# Patient Record
Sex: FEMALE | Race: OTHER | HISPANIC OR LATINO | URBAN - METROPOLITAN AREA
[De-identification: names, ages, dates, MRNs, and addresses within clinical notes are randomized per-mention and may not be internally consistent; named-entity substitution may affect disease eponyms.]

---

## 2021-06-06 ENCOUNTER — EMERGENCY (EMERGENCY)
Facility: HOSPITAL | Age: 26
LOS: 1 days | Discharge: HOME | End: 2021-06-06
Attending: STUDENT IN AN ORGANIZED HEALTH CARE EDUCATION/TRAINING PROGRAM
Payer: COMMERCIAL

## 2021-06-06 VITALS
DIASTOLIC BLOOD PRESSURE: 71 MMHG | SYSTOLIC BLOOD PRESSURE: 122 MMHG | HEART RATE: 74 BPM | RESPIRATION RATE: 18 BRPM | TEMPERATURE: 98 F | OXYGEN SATURATION: 99 %

## 2021-06-06 VITALS
OXYGEN SATURATION: 100 % | HEART RATE: 68 BPM | SYSTOLIC BLOOD PRESSURE: 109 MMHG | DIASTOLIC BLOOD PRESSURE: 68 MMHG | RESPIRATION RATE: 18 BRPM

## 2021-06-06 LAB
ALBUMIN SERPL ELPH-MCNC: 4.5 G/DL — SIGNIFICANT CHANGE UP (ref 3.3–5.2)
ALP SERPL-CCNC: 63 U/L — SIGNIFICANT CHANGE UP (ref 40–120)
ALT FLD-CCNC: 10 U/L — SIGNIFICANT CHANGE UP
ANION GAP SERPL CALC-SCNC: 11 MMOL/L — SIGNIFICANT CHANGE UP (ref 5–17)
APPEARANCE UR: CLEAR — SIGNIFICANT CHANGE UP
AST SERPL-CCNC: 17 U/L — SIGNIFICANT CHANGE UP
BACTERIA # UR AUTO: NEGATIVE — SIGNIFICANT CHANGE UP
BASOPHILS # BLD AUTO: 0.07 K/UL — SIGNIFICANT CHANGE UP (ref 0–0.2)
BASOPHILS NFR BLD AUTO: 0.5 % — SIGNIFICANT CHANGE UP (ref 0–2)
BILIRUB SERPL-MCNC: 0.3 MG/DL — LOW (ref 0.4–2)
BILIRUB UR-MCNC: NEGATIVE — SIGNIFICANT CHANGE UP
BLD GP AB SCN SERPL QL: SIGNIFICANT CHANGE UP
BUN SERPL-MCNC: 9.4 MG/DL — SIGNIFICANT CHANGE UP (ref 8–20)
CALCIUM SERPL-MCNC: 9.4 MG/DL — SIGNIFICANT CHANGE UP (ref 8.6–10.2)
CHLORIDE SERPL-SCNC: 102 MMOL/L — SIGNIFICANT CHANGE UP (ref 98–107)
CO2 SERPL-SCNC: 26 MMOL/L — SIGNIFICANT CHANGE UP (ref 22–29)
COLOR SPEC: YELLOW — SIGNIFICANT CHANGE UP
CREAT SERPL-MCNC: 0.61 MG/DL — SIGNIFICANT CHANGE UP (ref 0.5–1.3)
DIFF PNL FLD: ABNORMAL
EOSINOPHIL # BLD AUTO: 0.23 K/UL — SIGNIFICANT CHANGE UP (ref 0–0.5)
EOSINOPHIL NFR BLD AUTO: 1.5 % — SIGNIFICANT CHANGE UP (ref 0–6)
EPI CELLS # UR: SIGNIFICANT CHANGE UP
GLUCOSE SERPL-MCNC: 104 MG/DL — HIGH (ref 70–99)
GLUCOSE UR QL: NEGATIVE MG/DL — SIGNIFICANT CHANGE UP
HCG SERPL-ACNC: 15.7 MIU/ML — HIGH
HCT VFR BLD CALC: 37.9 % — SIGNIFICANT CHANGE UP (ref 34.5–45)
HGB BLD-MCNC: 13.1 G/DL — SIGNIFICANT CHANGE UP (ref 11.5–15.5)
IMM GRANULOCYTES NFR BLD AUTO: 0.3 % — SIGNIFICANT CHANGE UP (ref 0–1.5)
KETONES UR-MCNC: NEGATIVE — SIGNIFICANT CHANGE UP
LEUKOCYTE ESTERASE UR-ACNC: NEGATIVE — SIGNIFICANT CHANGE UP
LYMPHOCYTES # BLD AUTO: 18.2 % — SIGNIFICANT CHANGE UP (ref 13–44)
LYMPHOCYTES # BLD AUTO: 2.8 K/UL — SIGNIFICANT CHANGE UP (ref 1–3.3)
MCHC RBC-ENTMCNC: 31.9 PG — SIGNIFICANT CHANGE UP (ref 27–34)
MCHC RBC-ENTMCNC: 34.6 GM/DL — SIGNIFICANT CHANGE UP (ref 32–36)
MCV RBC AUTO: 92.2 FL — SIGNIFICANT CHANGE UP (ref 80–100)
MONOCYTES # BLD AUTO: 0.81 K/UL — SIGNIFICANT CHANGE UP (ref 0–0.9)
MONOCYTES NFR BLD AUTO: 5.3 % — SIGNIFICANT CHANGE UP (ref 2–14)
NEUTROPHILS # BLD AUTO: 11.41 K/UL — HIGH (ref 1.8–7.4)
NEUTROPHILS NFR BLD AUTO: 74.2 % — SIGNIFICANT CHANGE UP (ref 43–77)
NITRITE UR-MCNC: NEGATIVE — SIGNIFICANT CHANGE UP
PH UR: 6.5 — SIGNIFICANT CHANGE UP (ref 5–8)
PLATELET # BLD AUTO: 305 K/UL — SIGNIFICANT CHANGE UP (ref 150–400)
POTASSIUM SERPL-MCNC: 3.8 MMOL/L — SIGNIFICANT CHANGE UP (ref 3.5–5.3)
POTASSIUM SERPL-SCNC: 3.8 MMOL/L — SIGNIFICANT CHANGE UP (ref 3.5–5.3)
PROT SERPL-MCNC: 7.5 G/DL — SIGNIFICANT CHANGE UP (ref 6.6–8.7)
PROT UR-MCNC: NEGATIVE MG/DL — SIGNIFICANT CHANGE UP
RBC # BLD: 4.11 M/UL — SIGNIFICANT CHANGE UP (ref 3.8–5.2)
RBC # FLD: 11.3 % — SIGNIFICANT CHANGE UP (ref 10.3–14.5)
RBC CASTS # UR COMP ASSIST: ABNORMAL /HPF (ref 0–4)
SODIUM SERPL-SCNC: 139 MMOL/L — SIGNIFICANT CHANGE UP (ref 135–145)
SP GR SPEC: 1.01 — SIGNIFICANT CHANGE UP (ref 1.01–1.02)
UROBILINOGEN FLD QL: NEGATIVE MG/DL — SIGNIFICANT CHANGE UP
WBC # BLD: 15.37 K/UL — HIGH (ref 3.8–10.5)
WBC # FLD AUTO: 15.37 K/UL — HIGH (ref 3.8–10.5)
WBC UR QL: SIGNIFICANT CHANGE UP

## 2021-06-06 PROCEDURE — 86850 RBC ANTIBODY SCREEN: CPT

## 2021-06-06 PROCEDURE — 99284 EMERGENCY DEPT VISIT MOD MDM: CPT

## 2021-06-06 PROCEDURE — 36415 COLL VENOUS BLD VENIPUNCTURE: CPT

## 2021-06-06 PROCEDURE — 86901 BLOOD TYPING SEROLOGIC RH(D): CPT

## 2021-06-06 PROCEDURE — 87086 URINE CULTURE/COLONY COUNT: CPT

## 2021-06-06 PROCEDURE — 86900 BLOOD TYPING SEROLOGIC ABO: CPT

## 2021-06-06 PROCEDURE — 76817 TRANSVAGINAL US OBSTETRIC: CPT | Mod: 26,59

## 2021-06-06 PROCEDURE — 80053 COMPREHEN METABOLIC PANEL: CPT

## 2021-06-06 PROCEDURE — 84702 CHORIONIC GONADOTROPIN TEST: CPT

## 2021-06-06 PROCEDURE — 99284 EMERGENCY DEPT VISIT MOD MDM: CPT | Mod: 25

## 2021-06-06 PROCEDURE — 81001 URINALYSIS AUTO W/SCOPE: CPT

## 2021-06-06 PROCEDURE — 76801 OB US < 14 WKS SINGLE FETUS: CPT

## 2021-06-06 PROCEDURE — 76817 TRANSVAGINAL US OBSTETRIC: CPT

## 2021-06-06 PROCEDURE — 85025 COMPLETE CBC W/AUTO DIFF WBC: CPT

## 2021-06-06 PROCEDURE — 76801 OB US < 14 WKS SINGLE FETUS: CPT | Mod: 26

## 2021-06-06 RX ORDER — ACETAMINOPHEN 500 MG
650 TABLET ORAL ONCE
Refills: 0 | Status: COMPLETED | OUTPATIENT
Start: 2021-06-06 | End: 2021-06-06

## 2021-06-06 RX ADMIN — Medication 650 MILLIGRAM(S): at 04:07

## 2021-06-06 NOTE — ED PROVIDER NOTE - OBJECTIVE STATEMENT
25 y/o F with PMHx PCOS  at approx 8 wks. presents to ED c/o vaginal spotting and abdominal cramping since yesterday at 5pm. Pt reports using 2 pads per day. Pt was seen at [facility] clinic but did not have an ultrasound yet this pregnancy. Pt has upcoming appt at [facility]. Reports recent intercourse. Took no analgesics prior to arrival.   LMP: 21, H 27 y/o F with PMHx PCOS  at approx 8 wks. presents to ED c/o vaginal spotting and abdominal cramping since yesterday at 5pm. Pt reports using2 panty liners today. Pt was seen at [facility] clinic but did not have an ultrasound yet this pregnancy. Pt has upcoming appt at [facility]. Reports recent intercourse. Took no analgesics prior to arrival.   LMP: 21, [facility]

## 2021-06-06 NOTE — ED PROVIDER NOTE - CLINICAL SUMMARY MEDICAL DECISION MAKING FREE TEXT BOX
25 y/o F with PMHx PCOS  at approx 8 wks. presents to ED c/o vaginal spotting and abdominal cramping since yesterday at 5pm. Pt reports using 2 pads per day. Pt was seen at [facility] clinic but did not have an ultrasound yet this pregnancy. Will check labs, urine, US ob, tylenol and reassess

## 2021-06-06 NOTE — ED PROVIDER NOTE - NSFOLLOWUPINSTRUCTIONS_ED_ALL_ED_FT
- Sharron un seguimiento con stockton médico de atención primaria en 1 o 2 días. Si no puede hacer un seguimiento con stockton médico de atención primaria, regrese al servicio de urgencias por cualquier problema urgente.  - Busque atención médica inmediata ante cualquier signo o síntoma nuevo, que empeore o que le preocupe.  - Se le entregaron copias de todos los resultados de abisai pruebas, llévelas a stockton médico de atención primaria para que revise los resultados anormales  - Vaya a stockton ginecólogo el doroteo (6/8/21) para gigi repetición de ultrasonido y trabajo de laboratorio, si no puede ir a stockton ginecólogo, regrese a la alison de emergencias el doroteo    - Si tiene dificultades para realizar un seguimiento, llame al: [phone] ([id number]) o visite [email or web address] para obtener un médico o especialista de [facility] que acepte stockton seguro en stockton área.    ¡Sentirse mejor!   Hemorragia vaginal luis antonio el primer trimestre de embarazo    Vaginal Bleeding During Pregnancy, First Trimester       Luis Antonio los primeros meses del embarazo es relativamente frecuente que se presente gigi pequeña hemorragia (pérdidas) proveniente de la vagina. Normalmente esto se detiene por sí solo. Estas hemorragias o pérdidas tienen diversas causas al inicio del embarazo. Algunas pueden estar relacionadas al embarazo y otras no. En muchos casos, la hemorragia es normal y no es un problema. Sin embargo, la hemorragia también puede ser un signo de algo grave. Debe informar a stockton médico de inmediato si tiene alguna hemorragia vaginal.  Algunas causas posibles de hemorragia vaginal luis antonio el primer trimestre incluyen lo siguiente:  •Infección o inflamación del maico uterino.      •Crecimientos anormales (pólipos) en el maico uterino.      •Aborto espontáneo o amenaza de aborto espontáneo.      •Tejido fetal que se desarrolla fuera del útero (embarazo ectópico).      •Gigi masa de tejido que crece en el útero debido a gigi stephanie fertilización del óvulo (embarazo molar).        Siga estas indicaciones en stockton casa:    Actividad     •Siga las indicaciones del médico respecto de las restricciones en las actividades. Pregunte qué actividades son seguras para usted.      •Si es necesario, organícese para que alguien la ayude con las actividades cotidianas.      • No tenga relaciones sexuales ni orgasmos hasta que stockton médico le diga que es seguro.      Instrucciones generales     •[place] los medicamentos de venta valentín y los recetados solamente alfredito se lo haya indicado el médico.      •Esté atenta a cualquier cambio en los síntomas.      • No use tampones ni se sharron duchas vaginales.      •Lleve un registro de la cantidad de apósitos higiénicos que usa por día, la frecuencia con la que los cambia y cuán empapados (saturados) están.      •Si elimina tejido por la vagina, guárdelo para mostrárselo al médico.      •Concurra a todas las visitas de control alfredito se lo haya indicado el médico. [place] es importante.        Comuníquese con un médico si:    •Tiene un sangrado vaginal en cualquier momento del embarazo.      •Tiene calambres o krzysztof de parto.      •Tiene fiebre.        Solicite ayuda de inmediato si:    •Tiene cólicos intensos en la espalda o el abdomen.      •Elimina coágulos grandes o gran cantidad de tejido por la vagina.      •La hemorragia aumenta.      •Se siente mareada, débil, o se desmaya.      •Tiene escalofríos.      •Tiene gigi pérdida importante o sale líquido a borbotones por la vagina.        Resumen    •Luis Antonio los primeros meses del embarazo es relativamente frecuente que se presente gigi pequeña hemorragia (pérdidas) proveniente de la vagina.      •Estas hemorragias o pérdidas tienen diversas causas al inicio del embarazo.      •Debe informar a stockton médico de inmediato si tiene alguna hemorragia vaginal.      Esta información no tiene alfredito fin reemplazar el consejo del médico. Asegúrese de hacerle al médico cualquier pregunta que tenga.

## 2021-06-06 NOTE — ED PROVIDER NOTE - ATTENDING CONTRIBUTION TO CARE
25yo female with pmh of PCOS  at 8weeks by LMP  no doc IUP presents with vaginal spotting and cramping for 1 day. Pt states she used 2 panty liners today due to the bleeding. +recent intercourse Pt denies fevers/chills, ha, loc, focal neuro deficits, cp/sob/palp, cough, n/v/d, urinary symptoms, recent travel and sick contacts.  Const: Awake, alert and oriented. In no acute distress. Well appearing.  HEENT: NC/AT. Moist mucous membranes.  Eyes: No scleral icterus. EOMI.  Neck:. Soft and supple. Full ROM without pain.  Cardiac: Regular rate and regular rhythm. +S1/S2. Peripheral pulses 2+ and symmetric. No LE edema.  Resp: Speaking in full sentences. No evidence of respiratory distress. No wheezes, rales or rhonchi.  Abd: Soft, non-tender, non-distended. Normal bowel sounds in all 4 quadrants. No guarding or rebound.  Back: Spine midline and non-tender. No CVAT.  Skin: No rashes, abrasions or lacerations.  Lymph: No cervical lymphadenopathy.  Neuro: Awake, alert & oriented x 3. Moves all extremities symmetrically.  pt with early preg, follow up within 24-48 hours to see development of pregnancy, pt understands plan

## 2021-06-06 NOTE — ED PROVIDER NOTE - PHYSICAL EXAMINATION
Vital signs noted, see flowsheet.  General: NAD, well appearing and non-toxic.  HEENT: NC/AT. MMM. Conjunctiva and sclera clear b/l.  EOMI. PERRL.  Neck: Soft and supple  Cardiac: RRR. +S1/S2. Peripheral pulses 2+ and symmetric b/l.   Respiratory: Speaking in full sentences, no evidence of respiratory distress. Lungs CTA b/l, no wheezes/rhonchi/rales/stridor.   Abdomen: Normoactive bowel sounds x 4 quadrants. Soft, NTND. No guarding or rebound tenderness. No suprapubic tenderness.  Back: Spine midline and non-tender. No CVAT.  Skin: Normal color for race, no evidence of rash, ecchymosis, cyanosis or jaundice.   Neuro: Awake, alert and oriented to person/place/time/situation. Moves all extremities  Psych: Normal affect

## 2021-06-06 NOTE — ED PROVIDER NOTE - PATIENT PORTAL LINK FT
You can access the FollowMyHealth Patient Portal offered by [facility] by registering at the following website: [email or web address]. By joining [employer]’s FollowMyHealth portal, you will also be able to view your health information using other applications (apps) compatible with our system.

## 2021-06-06 NOTE — ED PROVIDER NOTE - PROGRESS NOTE DETAILS
SERAFIN Dudley NOTE: Reviewed all results and plan with Dr. Glover, will advise return 48 hrs for rpt lab/US  Abd soft NTND no rebound or guarding. Pt stable for d/c, reports improvement, VSS, tolerating PO, ambulatory.  Discussion includes results, plan, and return precautions. Pt advised to f/u with PMD 1-2 days and specialists discussed.  Printed copies of available lab/radiology results contained within discharge packet. Pt verbalized understanding/agreement of plan. ED  Jose Serrano

## 2021-06-07 LAB
CULTURE RESULTS: NO GROWTH — SIGNIFICANT CHANGE UP
SPECIMEN SOURCE: SIGNIFICANT CHANGE UP

## 2021-09-24 ENCOUNTER — EMERGENCY (EMERGENCY)
Facility: HOSPITAL | Age: 26
LOS: 1 days | Discharge: HOME | End: 2021-09-24
Attending: EMERGENCY MEDICINE
Payer: COMMERCIAL

## 2021-09-24 VITALS
TEMPERATURE: 99 F | SYSTOLIC BLOOD PRESSURE: 114 MMHG | WEIGHT: 152.34 LBS | RESPIRATION RATE: 18 BRPM | OXYGEN SATURATION: 100 % | DIASTOLIC BLOOD PRESSURE: 76 MMHG | HEART RATE: 72 BPM

## 2021-09-24 LAB
ALBUMIN SERPL ELPH-MCNC: 4.5 G/DL — SIGNIFICANT CHANGE UP (ref 3.3–5.2)
ALP SERPL-CCNC: 52 U/L — SIGNIFICANT CHANGE UP (ref 40–120)
ALT FLD-CCNC: 10 U/L — SIGNIFICANT CHANGE UP
ANION GAP SERPL CALC-SCNC: 12 MMOL/L — SIGNIFICANT CHANGE UP (ref 5–17)
APPEARANCE UR: CLEAR — SIGNIFICANT CHANGE UP
AST SERPL-CCNC: 15 U/L — SIGNIFICANT CHANGE UP
BASOPHILS # BLD AUTO: 0.05 K/UL — SIGNIFICANT CHANGE UP (ref 0–0.2)
BASOPHILS NFR BLD AUTO: 0.4 % — SIGNIFICANT CHANGE UP (ref 0–2)
BILIRUB SERPL-MCNC: 0.5 MG/DL — SIGNIFICANT CHANGE UP (ref 0.4–2)
BILIRUB UR-MCNC: NEGATIVE — SIGNIFICANT CHANGE UP
BLD GP AB SCN SERPL QL: SIGNIFICANT CHANGE UP
BUN SERPL-MCNC: 9.6 MG/DL — SIGNIFICANT CHANGE UP (ref 8–20)
CALCIUM SERPL-MCNC: 9.3 MG/DL — SIGNIFICANT CHANGE UP (ref 8.6–10.2)
CHLORIDE SERPL-SCNC: 100 MMOL/L — SIGNIFICANT CHANGE UP (ref 98–107)
CO2 SERPL-SCNC: 23 MMOL/L — SIGNIFICANT CHANGE UP (ref 22–29)
COLOR SPEC: YELLOW — SIGNIFICANT CHANGE UP
CREAT SERPL-MCNC: 0.52 MG/DL — SIGNIFICANT CHANGE UP (ref 0.5–1.3)
DIFF PNL FLD: NEGATIVE — SIGNIFICANT CHANGE UP
EOSINOPHIL # BLD AUTO: 0.16 K/UL — SIGNIFICANT CHANGE UP (ref 0–0.5)
EOSINOPHIL NFR BLD AUTO: 1.2 % — SIGNIFICANT CHANGE UP (ref 0–6)
GLUCOSE SERPL-MCNC: 98 MG/DL — SIGNIFICANT CHANGE UP (ref 70–99)
GLUCOSE UR QL: NEGATIVE MG/DL — SIGNIFICANT CHANGE UP
HCG SERPL-ACNC: HIGH MIU/ML
HCT VFR BLD CALC: 34.1 % — LOW (ref 34.5–45)
HGB BLD-MCNC: 11.8 G/DL — SIGNIFICANT CHANGE UP (ref 11.5–15.5)
IMM GRANULOCYTES NFR BLD AUTO: 0.4 % — SIGNIFICANT CHANGE UP (ref 0–1.5)
KETONES UR-MCNC: NEGATIVE — SIGNIFICANT CHANGE UP
LEUKOCYTE ESTERASE UR-ACNC: NEGATIVE — SIGNIFICANT CHANGE UP
LYMPHOCYTES # BLD AUTO: 27.1 % — SIGNIFICANT CHANGE UP (ref 13–44)
LYMPHOCYTES # BLD AUTO: 3.6 K/UL — HIGH (ref 1–3.3)
MCHC RBC-ENTMCNC: 32.1 PG — SIGNIFICANT CHANGE UP (ref 27–34)
MCHC RBC-ENTMCNC: 34.6 GM/DL — SIGNIFICANT CHANGE UP (ref 32–36)
MCV RBC AUTO: 92.7 FL — SIGNIFICANT CHANGE UP (ref 80–100)
MONOCYTES # BLD AUTO: 0.77 K/UL — SIGNIFICANT CHANGE UP (ref 0–0.9)
MONOCYTES NFR BLD AUTO: 5.8 % — SIGNIFICANT CHANGE UP (ref 2–14)
NEUTROPHILS # BLD AUTO: 8.66 K/UL — HIGH (ref 1.8–7.4)
NEUTROPHILS NFR BLD AUTO: 65.1 % — SIGNIFICANT CHANGE UP (ref 43–77)
NITRITE UR-MCNC: NEGATIVE — SIGNIFICANT CHANGE UP
PH UR: 7 — SIGNIFICANT CHANGE UP (ref 5–8)
PLATELET # BLD AUTO: 274 K/UL — SIGNIFICANT CHANGE UP (ref 150–400)
POTASSIUM SERPL-MCNC: 3.8 MMOL/L — SIGNIFICANT CHANGE UP (ref 3.5–5.3)
POTASSIUM SERPL-SCNC: 3.8 MMOL/L — SIGNIFICANT CHANGE UP (ref 3.5–5.3)
PROT SERPL-MCNC: 7.5 G/DL — SIGNIFICANT CHANGE UP (ref 6.6–8.7)
PROT UR-MCNC: NEGATIVE MG/DL — SIGNIFICANT CHANGE UP
RBC # BLD: 3.68 M/UL — LOW (ref 3.8–5.2)
RBC # FLD: 11.2 % — SIGNIFICANT CHANGE UP (ref 10.3–14.5)
SODIUM SERPL-SCNC: 135 MMOL/L — SIGNIFICANT CHANGE UP (ref 135–145)
SP GR SPEC: 1.01 — SIGNIFICANT CHANGE UP (ref 1.01–1.02)
UROBILINOGEN FLD QL: NEGATIVE MG/DL — SIGNIFICANT CHANGE UP
WBC # BLD: 13.29 K/UL — HIGH (ref 3.8–10.5)
WBC # FLD AUTO: 13.29 K/UL — HIGH (ref 3.8–10.5)

## 2021-09-24 PROCEDURE — 76801 OB US < 14 WKS SINGLE FETUS: CPT

## 2021-09-24 PROCEDURE — 36415 COLL VENOUS BLD VENIPUNCTURE: CPT

## 2021-09-24 PROCEDURE — 85025 COMPLETE CBC W/AUTO DIFF WBC: CPT

## 2021-09-24 PROCEDURE — 99284 EMERGENCY DEPT VISIT MOD MDM: CPT

## 2021-09-24 PROCEDURE — 86900 BLOOD TYPING SEROLOGIC ABO: CPT

## 2021-09-24 PROCEDURE — 86901 BLOOD TYPING SEROLOGIC RH(D): CPT

## 2021-09-24 PROCEDURE — 99284 EMERGENCY DEPT VISIT MOD MDM: CPT | Mod: 25

## 2021-09-24 PROCEDURE — 86850 RBC ANTIBODY SCREEN: CPT

## 2021-09-24 PROCEDURE — 84702 CHORIONIC GONADOTROPIN TEST: CPT

## 2021-09-24 PROCEDURE — 80053 COMPREHEN METABOLIC PANEL: CPT

## 2021-09-24 PROCEDURE — 76817 TRANSVAGINAL US OBSTETRIC: CPT

## 2021-09-24 PROCEDURE — 87086 URINE CULTURE/COLONY COUNT: CPT

## 2021-09-24 PROCEDURE — 76801 OB US < 14 WKS SINGLE FETUS: CPT | Mod: 26

## 2021-09-24 PROCEDURE — 76817 TRANSVAGINAL US OBSTETRIC: CPT | Mod: 26,59

## 2021-09-24 PROCEDURE — 81003 URINALYSIS AUTO W/O SCOPE: CPT

## 2021-09-24 NOTE — ED ADULT NURSE NOTE - OBJECTIVE STATEMENT
A&Ox4, RR even and unlabored. Skin is warm and dry. Pt C/O of vaginal bleeding associated with intermittent cramping.  +blood clots.  Pt is currently about 6 weeks pregnant.  No fever, urinary symptoms, N/V/D.

## 2021-09-24 NOTE — ED PROVIDER NOTE - PATIENT PORTAL LINK FT
You can access the FollowMyHealth Patient Portal offered by [facility] by registering at the following website: [email or web address]. By joining [employer]’s FollowMyHealth portal, you will also be able to view your health information using other applications (apps) compatible with our system.

## 2021-09-24 NOTE — ED PROVIDER NOTE - NSFOLLOWUPINSTRUCTIONS_ED_ALL_ED_FT
- Follow up with your doctor within 2-3 days.   - Return to the ED for any new or worsening symptoms.   - Follow-up with your OBGYN appointment as scheduled  - Bring a copy of all of your results with you      - Agustin un seguimiento con stockton médico dentro de 2-3 días.  - Regrese al servicio de urgencias por cualquier síntoma nuevo o que empeore.  - Seguimiento con stockton yodit con el obstetra y ginecólogo según lo programado  - Lleva contigo gigi copia de todos tus resultados

## 2021-09-24 NOTE — ED PROVIDER NOTE - PROGRESS NOTE DETAILS
SERAFIN Neely: PT evaluated by intake physician. HPI/PE/ROS as noted above. Will follow up plan per intake physician SERAFIN Neely: labs grossly unremarkable, sono showing live intrauterine gestation, subcentimeter subchorionic hematoma. dec fetal HR 109bpm. all results discussed with pt with ED  Luis Reyes. Pt has obgyn appt on october 9th. instructed to bring results with her to appt and f/u regarding FHR. Pt provided copy of all results, educated on return precautions. stable for dc.

## 2021-09-25 PROBLEM — E28.2 POLYCYSTIC OVARIAN SYNDROME: Chronic | Status: ACTIVE | Noted: 2021-06-06

## 2021-09-25 LAB
CULTURE RESULTS: NO GROWTH — SIGNIFICANT CHANGE UP
SPECIMEN SOURCE: SIGNIFICANT CHANGE UP

## 2021-10-07 ENCOUNTER — EMERGENCY (EMERGENCY)
Facility: HOSPITAL | Age: 26
LOS: 1 days | Discharge: HOME | End: 2021-10-07
Attending: EMERGENCY MEDICINE
Payer: COMMERCIAL

## 2021-10-07 VITALS
DIASTOLIC BLOOD PRESSURE: 77 MMHG | WEIGHT: 153.44 LBS | RESPIRATION RATE: 18 BRPM | HEART RATE: 92 BPM | SYSTOLIC BLOOD PRESSURE: 115 MMHG | OXYGEN SATURATION: 100 % | TEMPERATURE: 99 F | HEIGHT: 63 IN

## 2021-10-07 PROCEDURE — 99285 EMERGENCY DEPT VISIT HI MDM: CPT

## 2021-10-07 RX ORDER — ACETAMINOPHEN 500 MG
650 TABLET ORAL ONCE
Refills: 0 | Status: COMPLETED | OUTPATIENT
Start: 2021-10-07 | End: 2021-10-07

## 2021-10-07 NOTE — ED ADULT TRIAGE NOTE - CHIEF COMPLAINT QUOTE
Ambulatory reporting that she is 8 weeks pregnant and has had approx 45 minutes of vaginal bleeding. Reports using one pad so far. , previous miscarriage happened at 8 weeks. Reports lower back and lower abdominal pain. Was seen  @ [facility] for same issue.

## 2021-10-08 VITALS
OXYGEN SATURATION: 100 % | DIASTOLIC BLOOD PRESSURE: 70 MMHG | RESPIRATION RATE: 18 BRPM | HEART RATE: 85 BPM | TEMPERATURE: 99 F | SYSTOLIC BLOOD PRESSURE: 115 MMHG

## 2021-10-08 LAB
ALBUMIN SERPL ELPH-MCNC: 4.1 G/DL — SIGNIFICANT CHANGE UP (ref 3.3–5.2)
ALP SERPL-CCNC: 46 U/L — SIGNIFICANT CHANGE UP (ref 40–120)
ALT FLD-CCNC: 18 U/L — SIGNIFICANT CHANGE UP
ANION GAP SERPL CALC-SCNC: 10 MMOL/L — SIGNIFICANT CHANGE UP (ref 5–17)
APPEARANCE UR: CLEAR — SIGNIFICANT CHANGE UP
AST SERPL-CCNC: 16 U/L — SIGNIFICANT CHANGE UP
BACTERIA # UR AUTO: ABNORMAL
BASOPHILS # BLD AUTO: 0.06 K/UL — SIGNIFICANT CHANGE UP (ref 0–0.2)
BASOPHILS NFR BLD AUTO: 0.4 % — SIGNIFICANT CHANGE UP (ref 0–2)
BILIRUB SERPL-MCNC: 0.4 MG/DL — SIGNIFICANT CHANGE UP (ref 0.4–2)
BILIRUB UR-MCNC: NEGATIVE — SIGNIFICANT CHANGE UP
BLD GP AB SCN SERPL QL: SIGNIFICANT CHANGE UP
BUN SERPL-MCNC: 6.3 MG/DL — LOW (ref 8–20)
CALCIUM SERPL-MCNC: 8.8 MG/DL — SIGNIFICANT CHANGE UP (ref 8.6–10.2)
CHLORIDE SERPL-SCNC: 104 MMOL/L — SIGNIFICANT CHANGE UP (ref 98–107)
CO2 SERPL-SCNC: 22 MMOL/L — SIGNIFICANT CHANGE UP (ref 22–29)
COLOR SPEC: YELLOW — SIGNIFICANT CHANGE UP
CREAT SERPL-MCNC: 0.44 MG/DL — LOW (ref 0.5–1.3)
DIFF PNL FLD: ABNORMAL
EOSINOPHIL # BLD AUTO: 0.23 K/UL — SIGNIFICANT CHANGE UP (ref 0–0.5)
EOSINOPHIL NFR BLD AUTO: 1.7 % — SIGNIFICANT CHANGE UP (ref 0–6)
EPI CELLS # UR: ABNORMAL
GLUCOSE SERPL-MCNC: 95 MG/DL — SIGNIFICANT CHANGE UP (ref 70–99)
GLUCOSE UR QL: NEGATIVE MG/DL — SIGNIFICANT CHANGE UP
HCG SERPL-ACNC: HIGH MIU/ML
HCT VFR BLD CALC: 32.3 % — LOW (ref 34.5–45)
HGB BLD-MCNC: 11.3 G/DL — LOW (ref 11.5–15.5)
IMM GRANULOCYTES NFR BLD AUTO: 0.2 % — SIGNIFICANT CHANGE UP (ref 0–1.5)
KETONES UR-MCNC: NEGATIVE — SIGNIFICANT CHANGE UP
LEUKOCYTE ESTERASE UR-ACNC: ABNORMAL
LYMPHOCYTES # BLD AUTO: 25.7 % — SIGNIFICANT CHANGE UP (ref 13–44)
LYMPHOCYTES # BLD AUTO: 3.48 K/UL — HIGH (ref 1–3.3)
MCHC RBC-ENTMCNC: 32.1 PG — SIGNIFICANT CHANGE UP (ref 27–34)
MCHC RBC-ENTMCNC: 35 GM/DL — SIGNIFICANT CHANGE UP (ref 32–36)
MCV RBC AUTO: 91.8 FL — SIGNIFICANT CHANGE UP (ref 80–100)
MONOCYTES # BLD AUTO: 0.84 K/UL — SIGNIFICANT CHANGE UP (ref 0–0.9)
MONOCYTES NFR BLD AUTO: 6.2 % — SIGNIFICANT CHANGE UP (ref 2–14)
NEUTROPHILS # BLD AUTO: 8.89 K/UL — HIGH (ref 1.8–7.4)
NEUTROPHILS NFR BLD AUTO: 65.8 % — SIGNIFICANT CHANGE UP (ref 43–77)
NITRITE UR-MCNC: NEGATIVE — SIGNIFICANT CHANGE UP
PH UR: 8 — SIGNIFICANT CHANGE UP (ref 5–8)
PLATELET # BLD AUTO: 297 K/UL — SIGNIFICANT CHANGE UP (ref 150–400)
POTASSIUM SERPL-MCNC: 4 MMOL/L — SIGNIFICANT CHANGE UP (ref 3.5–5.3)
POTASSIUM SERPL-SCNC: 4 MMOL/L — SIGNIFICANT CHANGE UP (ref 3.5–5.3)
PROT SERPL-MCNC: 6.9 G/DL — SIGNIFICANT CHANGE UP (ref 6.6–8.7)
PROT UR-MCNC: 15 MG/DL
RBC # BLD: 3.52 M/UL — LOW (ref 3.8–5.2)
RBC # FLD: 11.5 % — SIGNIFICANT CHANGE UP (ref 10.3–14.5)
RBC CASTS # UR COMP ASSIST: ABNORMAL /HPF (ref 0–4)
SODIUM SERPL-SCNC: 136 MMOL/L — SIGNIFICANT CHANGE UP (ref 135–145)
SP GR SPEC: 1.01 — SIGNIFICANT CHANGE UP (ref 1.01–1.02)
UROBILINOGEN FLD QL: NEGATIVE MG/DL — SIGNIFICANT CHANGE UP
WBC # BLD: 13.53 K/UL — HIGH (ref 3.8–10.5)
WBC # FLD AUTO: 13.53 K/UL — HIGH (ref 3.8–10.5)
WBC UR QL: SIGNIFICANT CHANGE UP

## 2021-10-08 PROCEDURE — 76817 TRANSVAGINAL US OBSTETRIC: CPT | Mod: 26

## 2021-10-08 PROCEDURE — 84702 CHORIONIC GONADOTROPIN TEST: CPT

## 2021-10-08 PROCEDURE — 80053 COMPREHEN METABOLIC PANEL: CPT

## 2021-10-08 PROCEDURE — 85025 COMPLETE CBC W/AUTO DIFF WBC: CPT

## 2021-10-08 PROCEDURE — 76801 OB US < 14 WKS SINGLE FETUS: CPT | Mod: 26

## 2021-10-08 PROCEDURE — T1013: CPT

## 2021-10-08 PROCEDURE — 36415 COLL VENOUS BLD VENIPUNCTURE: CPT

## 2021-10-08 PROCEDURE — 76801 OB US < 14 WKS SINGLE FETUS: CPT

## 2021-10-08 PROCEDURE — 81001 URINALYSIS AUTO W/SCOPE: CPT

## 2021-10-08 PROCEDURE — 76817 TRANSVAGINAL US OBSTETRIC: CPT

## 2021-10-08 PROCEDURE — 86850 RBC ANTIBODY SCREEN: CPT

## 2021-10-08 PROCEDURE — 86900 BLOOD TYPING SEROLOGIC ABO: CPT

## 2021-10-08 PROCEDURE — 86901 BLOOD TYPING SEROLOGIC RH(D): CPT

## 2021-10-08 PROCEDURE — 99284 EMERGENCY DEPT VISIT MOD MDM: CPT | Mod: 25

## 2021-10-08 RX ADMIN — Medication 650 MILLIGRAM(S): at 00:12

## 2021-10-08 NOTE — ED PROVIDER NOTE - CLINICAL SUMMARY MEDICAL DECISION MAKING FREE TEXT BOX
27yo  at 8 weeks with confirmed IUP presents with vaginal bleeding. Concern for threatened miscarriage and discussed risks with pt. Will obtain hcg level, tvus, rpt type and screen. Will give pain medication and reassess.

## 2021-10-08 NOTE — ED PROVIDER NOTE - OBJECTIVE STATEMENT
27yo woman  at 8 weeks with confirmed IUP 2 weeks ago presents with acute onset of vaginal bleeding at around 8:30pm requiring 1 pad since onset with associated lower abdomen and lower back cramping. No lightheadedness, chest pain, or SOB. No recent fevers or dysuria.  : Demond

## 2021-10-08 NOTE — ED PROVIDER NOTE - PHYSICAL EXAMINATION
General: well-appearing young woman in no acute distress  Head: normocephalic, atraumatic  Eyes: clear eyes, pink conjunctiva  Mouth: moist mucous membranes  Neck: supple neck  CV: normal rate and rhythm, peripheral pulses 2+ bilateral UE and LE  Respiratory: clear to auscultation bilaterally  Abdomen: soft, nontender, nondistended  : examined with Joan Hamm, RN; dark blood in vaginal vault with minimal bleeding from os, closed os, no CMT, no adnexal tenderness  MSK: no joint deformities  Neuro: alert and oriented x3, speech clear, moving all extremities spontaneously without difficulty  Skin: no rash noted

## 2021-10-08 NOTE — ED ADULT NURSE NOTE - CHIEF COMPLAINT QUOTE
Ambulatory reporting that she is 8 weeks pregnant and has had approx 45 minutes of vaginal bleeding. Reports using one pad so far. , previous miscarriage happened at 8 weeks. Reports lower back and lower abdominal pain. Was seen  @ [facility] for same issue.

## 2021-10-08 NOTE — ED ADULT NURSE NOTE - OBJECTIVE STATEMENT
Patient is about 8 weeks pregnant and having vaginal bleeding and cramping started at about 830 last night

## 2021-10-08 NOTE — ED PROVIDER NOTE - NS ED ROS FT
General: no fever  Head: no headache or lightheadedness  Eyes: no vision change  ENT: no nasal discharge/congestion  CV: no chest pain  Resp: no SOB  GI: no N/V, +lower abdominal cramping  : no dysuria, +vaginal bleeding  MSK: +lower back cramping  Skin: no new rash  Neuro: no focal weakness, no change in sensation

## 2021-10-08 NOTE — ED PROVIDER NOTE - PATIENT PORTAL LINK FT
You can access the FollowMyHealth Patient Portal offered by [facility] by registering at the following website: [email or web address]. By joining [employer]’s FollowMyHealth portal, you will also be able to view your health information using other applications (apps) compatible with our system.

## 2021-10-11 ENCOUNTER — APPOINTMENT (OUTPATIENT)
Dept: ANTEPARTUM | Facility: CLINIC | Age: 26
End: 2021-10-11
Payer: MEDICAID

## 2021-10-11 ENCOUNTER — ASOB RESULT (OUTPATIENT)
Age: 26
End: 2021-10-11

## 2021-10-11 PROBLEM — Z00.00 ENCOUNTER FOR PREVENTIVE HEALTH EXAMINATION: Status: ACTIVE | Noted: 2021-10-11

## 2021-10-11 PROCEDURE — 76801 OB US < 14 WKS SINGLE FETUS: CPT

## 2021-12-10 ENCOUNTER — EMERGENCY (EMERGENCY)
Facility: HOSPITAL | Age: 26
LOS: 1 days | Discharge: HOME | End: 2021-12-10
Attending: EMERGENCY MEDICINE
Payer: COMMERCIAL

## 2021-12-10 VITALS
OXYGEN SATURATION: 98 % | HEART RATE: 73 BPM | TEMPERATURE: 98 F | DIASTOLIC BLOOD PRESSURE: 83 MMHG | HEIGHT: 63 IN | RESPIRATION RATE: 20 BRPM | WEIGHT: 151.9 LBS | SYSTOLIC BLOOD PRESSURE: 126 MMHG

## 2021-12-10 LAB
ALBUMIN SERPL ELPH-MCNC: 3.9 G/DL — SIGNIFICANT CHANGE UP (ref 3.3–5.2)
ALP SERPL-CCNC: 44 U/L — SIGNIFICANT CHANGE UP (ref 40–120)
ALT FLD-CCNC: 10 U/L — SIGNIFICANT CHANGE UP
ANION GAP SERPL CALC-SCNC: 14 MMOL/L — SIGNIFICANT CHANGE UP (ref 5–17)
APPEARANCE UR: CLEAR — SIGNIFICANT CHANGE UP
AST SERPL-CCNC: 16 U/L — SIGNIFICANT CHANGE UP
BASOPHILS # BLD AUTO: 0.04 K/UL — SIGNIFICANT CHANGE UP (ref 0–0.2)
BASOPHILS NFR BLD AUTO: 0.3 % — SIGNIFICANT CHANGE UP (ref 0–2)
BILIRUB SERPL-MCNC: 0.4 MG/DL — SIGNIFICANT CHANGE UP (ref 0.4–2)
BILIRUB UR-MCNC: NEGATIVE — SIGNIFICANT CHANGE UP
BLD GP AB SCN SERPL QL: SIGNIFICANT CHANGE UP
BUN SERPL-MCNC: 6.4 MG/DL — LOW (ref 8–20)
CALCIUM SERPL-MCNC: 9 MG/DL — SIGNIFICANT CHANGE UP (ref 8.6–10.2)
CHLORIDE SERPL-SCNC: 103 MMOL/L — SIGNIFICANT CHANGE UP (ref 98–107)
CO2 SERPL-SCNC: 20 MMOL/L — LOW (ref 22–29)
COLOR SPEC: YELLOW — SIGNIFICANT CHANGE UP
CREAT SERPL-MCNC: 0.4 MG/DL — LOW (ref 0.5–1.3)
DIFF PNL FLD: NEGATIVE — SIGNIFICANT CHANGE UP
EOSINOPHIL # BLD AUTO: 0.3 K/UL — SIGNIFICANT CHANGE UP (ref 0–0.5)
EOSINOPHIL NFR BLD AUTO: 2.2 % — SIGNIFICANT CHANGE UP (ref 0–6)
EPI CELLS # UR: SIGNIFICANT CHANGE UP
GLUCOSE SERPL-MCNC: 83 MG/DL — SIGNIFICANT CHANGE UP (ref 70–99)
GLUCOSE UR QL: NEGATIVE MG/DL — SIGNIFICANT CHANGE UP
HCG SERPL-ACNC: HIGH MIU/ML
HCT VFR BLD CALC: 30.6 % — LOW (ref 34.5–45)
HGB BLD-MCNC: 10.6 G/DL — LOW (ref 11.5–15.5)
IMM GRANULOCYTES NFR BLD AUTO: 0.2 % — SIGNIFICANT CHANGE UP (ref 0–1.5)
KETONES UR-MCNC: ABNORMAL
LEUKOCYTE ESTERASE UR-ACNC: NEGATIVE — SIGNIFICANT CHANGE UP
LIDOCAIN IGE QN: 20 U/L — LOW (ref 22–51)
LYMPHOCYTES # BLD AUTO: 27.3 % — SIGNIFICANT CHANGE UP (ref 13–44)
LYMPHOCYTES # BLD AUTO: 3.65 K/UL — HIGH (ref 1–3.3)
MCHC RBC-ENTMCNC: 32.2 PG — SIGNIFICANT CHANGE UP (ref 27–34)
MCHC RBC-ENTMCNC: 34.6 GM/DL — SIGNIFICANT CHANGE UP (ref 32–36)
MCV RBC AUTO: 93 FL — SIGNIFICANT CHANGE UP (ref 80–100)
MONOCYTES # BLD AUTO: 0.76 K/UL — SIGNIFICANT CHANGE UP (ref 0–0.9)
MONOCYTES NFR BLD AUTO: 5.7 % — SIGNIFICANT CHANGE UP (ref 2–14)
NEUTROPHILS # BLD AUTO: 8.6 K/UL — HIGH (ref 1.8–7.4)
NEUTROPHILS NFR BLD AUTO: 64.3 % — SIGNIFICANT CHANGE UP (ref 43–77)
NITRITE UR-MCNC: NEGATIVE — SIGNIFICANT CHANGE UP
PH UR: 6.5 — SIGNIFICANT CHANGE UP (ref 5–8)
PLATELET # BLD AUTO: 240 K/UL — SIGNIFICANT CHANGE UP (ref 150–400)
POTASSIUM SERPL-MCNC: 3.6 MMOL/L — SIGNIFICANT CHANGE UP (ref 3.5–5.3)
POTASSIUM SERPL-SCNC: 3.6 MMOL/L — SIGNIFICANT CHANGE UP (ref 3.5–5.3)
PROT SERPL-MCNC: 6.6 G/DL — SIGNIFICANT CHANGE UP (ref 6.6–8.7)
PROT UR-MCNC: 15 MG/DL
RBC # BLD: 3.29 M/UL — LOW (ref 3.8–5.2)
RBC # FLD: 11.9 % — SIGNIFICANT CHANGE UP (ref 10.3–14.5)
SODIUM SERPL-SCNC: 137 MMOL/L — SIGNIFICANT CHANGE UP (ref 135–145)
SP GR SPEC: 1.01 — SIGNIFICANT CHANGE UP (ref 1.01–1.02)
UROBILINOGEN FLD QL: NEGATIVE MG/DL — SIGNIFICANT CHANGE UP
WBC # BLD: 13.38 K/UL — HIGH (ref 3.8–10.5)
WBC # FLD AUTO: 13.38 K/UL — HIGH (ref 3.8–10.5)

## 2021-12-10 PROCEDURE — 99285 EMERGENCY DEPT VISIT HI MDM: CPT

## 2021-12-10 NOTE — ED ADULT TRIAGE NOTE - CHIEF COMPLAINT QUOTE
Patient presents to ER C/O vaginal discharge with onset today, 17 weeks pregnant, denies fever, denies pain, resp even/unlabored.

## 2021-12-10 NOTE — ED PROVIDER NOTE - OBJECTIVE STATEMENT
27 y/o female  17 weeks pregnant with PMHx of PCOS. Pt states around 16:15 she started leaking water from her vagina. Pt states the water saturated the bed sheet. Pt follows at [facility]. Pt denies fevers, chills, chest pain, 25 y/o female  17 weeks pregnant with PMHx of PCOS. Pt states around 16:15 she started leaking water from her vagina. Pt states the water saturated the bed sheet. Pt follows at [facility]. Pt denies fevers, chills, chest pain,    : Lion

## 2021-12-10 NOTE — ED PROVIDER NOTE - PROGRESS NOTE DETAILS
MEGAN grove as shown, spoke with obgyn resident, advised they will see her and L&D, pt to be discahrged and go directly to L&D POLO- PT evaluated by intake physician. HPI/PE/ROS as noted above. Will follow up plan per intake physician

## 2021-12-10 NOTE — ED PROVIDER NOTE - ATTENDING CONTRIBUTION TO CARE
Abdominal Pain, N/V/D I, Fátima Becerra, performed the initial face to face bedside interview with this patient regarding history of present illness, review of symptoms and relevant past medical, social and family history.  I completed an independent physical examination.  I was the initial provider who evaluated this patient. I have signed out the follow up of any pending tests (i.e. labs, radiological studies) to the ACP.  I have communicated the patient’s plan of care and disposition with the ACP.  The history, relevant review of systems, past medical and surgical history, medical decision making, and physical examination was documented by the scribe in my presence and I attest to the accuracy of the documentation.

## 2021-12-11 ENCOUNTER — RESULT REVIEW (OUTPATIENT)
Age: 26
End: 2021-12-11

## 2021-12-11 ENCOUNTER — INPATIENT (INPATIENT)
Facility: HOSPITAL | Age: 26
LOS: 0 days | Discharge: HOME | End: 2021-12-11
Attending: OBSTETRICS & GYNECOLOGY | Admitting: OBSTETRICS & GYNECOLOGY
Payer: COMMERCIAL

## 2021-12-11 VITALS
SYSTOLIC BLOOD PRESSURE: 108 MMHG | RESPIRATION RATE: 16 BRPM | TEMPERATURE: 98 F | OXYGEN SATURATION: 99 % | HEART RATE: 92 BPM | DIASTOLIC BLOOD PRESSURE: 70 MMHG

## 2021-12-11 VITALS — SYSTOLIC BLOOD PRESSURE: 103 MMHG | HEART RATE: 69 BPM | DIASTOLIC BLOOD PRESSURE: 59 MMHG

## 2021-12-11 VITALS
HEIGHT: 61 IN | SYSTOLIC BLOOD PRESSURE: 113 MMHG | DIASTOLIC BLOOD PRESSURE: 69 MMHG | WEIGHT: 151.9 LBS | HEART RATE: 87 BPM | TEMPERATURE: 99 F | RESPIRATION RATE: 16 BRPM

## 2021-12-11 DIAGNOSIS — O47.02 FALSE LABOR BEFORE 37 COMPLETED WEEKS OF GESTATION, SECOND TRIMESTER: ICD-10-CM

## 2021-12-11 LAB
ALBUMIN SERPL ELPH-MCNC: 4.6 G/DL — SIGNIFICANT CHANGE UP (ref 3.3–5.2)
ALP SERPL-CCNC: 55 U/L — SIGNIFICANT CHANGE UP (ref 40–120)
ALT FLD-CCNC: 10 U/L — SIGNIFICANT CHANGE UP
ANION GAP SERPL CALC-SCNC: 14 MMOL/L — SIGNIFICANT CHANGE UP (ref 5–17)
APPEARANCE UR: CLEAR — SIGNIFICANT CHANGE UP
APTT BLD: 34 SEC — SIGNIFICANT CHANGE UP (ref 27.5–35.5)
AST SERPL-CCNC: 15 U/L — SIGNIFICANT CHANGE UP
BASOPHILS # BLD AUTO: 0.06 K/UL — SIGNIFICANT CHANGE UP (ref 0–0.2)
BASOPHILS NFR BLD AUTO: 0.5 % — SIGNIFICANT CHANGE UP (ref 0–2)
BILIRUB SERPL-MCNC: 0.7 MG/DL — SIGNIFICANT CHANGE UP (ref 0.4–2)
BILIRUB UR-MCNC: NEGATIVE — SIGNIFICANT CHANGE UP
BLD GP AB SCN SERPL QL: SIGNIFICANT CHANGE UP
BUN SERPL-MCNC: 4.7 MG/DL — LOW (ref 8–20)
CALCIUM SERPL-MCNC: 9.6 MG/DL — SIGNIFICANT CHANGE UP (ref 8.6–10.2)
CHLORIDE SERPL-SCNC: 105 MMOL/L — SIGNIFICANT CHANGE UP (ref 98–107)
CO2 SERPL-SCNC: 20 MMOL/L — LOW (ref 22–29)
COLOR SPEC: YELLOW — SIGNIFICANT CHANGE UP
COVID-19 SPIKE DOMAIN AB INTERP: POSITIVE
COVID-19 SPIKE DOMAIN ANTIBODY RESULT: 115 U/ML — HIGH
CREAT SERPL-MCNC: 0.38 MG/DL — LOW (ref 0.5–1.3)
DIFF PNL FLD: NEGATIVE — SIGNIFICANT CHANGE UP
EOSINOPHIL # BLD AUTO: 0.27 K/UL — SIGNIFICANT CHANGE UP (ref 0–0.5)
EOSINOPHIL NFR BLD AUTO: 2.5 % — SIGNIFICANT CHANGE UP (ref 0–6)
FIBRINOGEN PPP-MCNC: 509 MG/DL — SIGNIFICANT CHANGE UP (ref 290–520)
GLUCOSE SERPL-MCNC: 86 MG/DL — SIGNIFICANT CHANGE UP (ref 70–99)
GLUCOSE UR QL: NEGATIVE MG/DL — SIGNIFICANT CHANGE UP
HCT VFR BLD CALC: 33 % — LOW (ref 34.5–45)
HGB BLD-MCNC: 11.4 G/DL — LOW (ref 11.5–15.5)
IMM GRANULOCYTES NFR BLD AUTO: 0.4 % — SIGNIFICANT CHANGE UP (ref 0–1.5)
INR BLD: 1.07 RATIO — SIGNIFICANT CHANGE UP (ref 0.88–1.16)
KETONES UR-MCNC: NEGATIVE — SIGNIFICANT CHANGE UP
LEUKOCYTE ESTERASE UR-ACNC: NEGATIVE — SIGNIFICANT CHANGE UP
LYMPHOCYTES # BLD AUTO: 2.89 K/UL — SIGNIFICANT CHANGE UP (ref 1–3.3)
LYMPHOCYTES # BLD AUTO: 26.5 % — SIGNIFICANT CHANGE UP (ref 13–44)
MCHC RBC-ENTMCNC: 32 PG — SIGNIFICANT CHANGE UP (ref 27–34)
MCHC RBC-ENTMCNC: 34.5 GM/DL — SIGNIFICANT CHANGE UP (ref 32–36)
MCV RBC AUTO: 92.7 FL — SIGNIFICANT CHANGE UP (ref 80–100)
MEV IGG SER-ACNC: 20.2 AU/ML — SIGNIFICANT CHANGE UP
MEV IGG+IGM SER-IMP: POSITIVE — SIGNIFICANT CHANGE UP
MONOCYTES # BLD AUTO: 0.67 K/UL — SIGNIFICANT CHANGE UP (ref 0–0.9)
MONOCYTES NFR BLD AUTO: 6.1 % — SIGNIFICANT CHANGE UP (ref 2–14)
NEUTROPHILS # BLD AUTO: 6.99 K/UL — SIGNIFICANT CHANGE UP (ref 1.8–7.4)
NEUTROPHILS NFR BLD AUTO: 64 % — SIGNIFICANT CHANGE UP (ref 43–77)
NITRITE UR-MCNC: NEGATIVE — SIGNIFICANT CHANGE UP
PH UR: 8 — SIGNIFICANT CHANGE UP (ref 5–8)
PLATELET # BLD AUTO: 246 K/UL — SIGNIFICANT CHANGE UP (ref 150–400)
POTASSIUM SERPL-MCNC: 3.4 MMOL/L — LOW (ref 3.5–5.3)
POTASSIUM SERPL-SCNC: 3.4 MMOL/L — LOW (ref 3.5–5.3)
PROT SERPL-MCNC: 7.5 G/DL — SIGNIFICANT CHANGE UP (ref 6.6–8.7)
PROT UR-MCNC: NEGATIVE — SIGNIFICANT CHANGE UP
PROTHROM AB SERPL-ACNC: 12.4 SEC — SIGNIFICANT CHANGE UP (ref 10.6–13.6)
RBC # BLD: 3.56 M/UL — LOW (ref 3.8–5.2)
RBC # FLD: 11.8 % — SIGNIFICANT CHANGE UP (ref 10.3–14.5)
SARS-COV-2 IGG+IGM SERPL QL IA: 115 U/ML — HIGH
SARS-COV-2 IGG+IGM SERPL QL IA: POSITIVE
SARS-COV-2 RNA SPEC QL NAA+PROBE: SIGNIFICANT CHANGE UP
SODIUM SERPL-SCNC: 139 MMOL/L — SIGNIFICANT CHANGE UP (ref 135–145)
SP GR SPEC: 1.01 — SIGNIFICANT CHANGE UP (ref 1.01–1.02)
T PALLIDUM AB TITR SER: NEGATIVE — SIGNIFICANT CHANGE UP
T3 SERPL-MCNC: 192 NG/DL — SIGNIFICANT CHANGE UP (ref 80–200)
T3FREE SERPL-MCNC: 3.07 PG/ML — SIGNIFICANT CHANGE UP (ref 1.8–4.6)
T4 AB SER-ACNC: 12.2 UG/DL — HIGH (ref 4.5–12)
T4 FREE SERPL-MCNC: 1.2 NG/DL — SIGNIFICANT CHANGE UP (ref 0.9–1.8)
TSH SERPL-MCNC: 1.51 UIU/ML — SIGNIFICANT CHANGE UP (ref 0.27–4.2)
UROBILINOGEN FLD QL: NEGATIVE MG/DL — SIGNIFICANT CHANGE UP
WBC # BLD: 10.92 K/UL — HIGH (ref 3.8–10.5)
WBC # FLD AUTO: 10.92 K/UL — HIGH (ref 3.8–10.5)

## 2021-12-11 PROCEDURE — 99284 EMERGENCY DEPT VISIT MOD MDM: CPT | Mod: 25

## 2021-12-11 PROCEDURE — 86850 RBC ANTIBODY SCREEN: CPT

## 2021-12-11 PROCEDURE — 86901 BLOOD TYPING SEROLOGIC RH(D): CPT

## 2021-12-11 PROCEDURE — 81001 URINALYSIS AUTO W/SCOPE: CPT

## 2021-12-11 PROCEDURE — 86769 SARS-COV-2 COVID-19 ANTIBODY: CPT

## 2021-12-11 PROCEDURE — 86765 RUBEOLA ANTIBODY: CPT

## 2021-12-11 PROCEDURE — 85610 PROTHROMBIN TIME: CPT

## 2021-12-11 PROCEDURE — 84480 ASSAY TRIIODOTHYRONINE (T3): CPT

## 2021-12-11 PROCEDURE — 86900 BLOOD TYPING SEROLOGIC ABO: CPT

## 2021-12-11 PROCEDURE — 59050 FETAL MONITOR W/REPORT: CPT

## 2021-12-11 PROCEDURE — 84443 ASSAY THYROID STIM HORMONE: CPT

## 2021-12-11 PROCEDURE — 36415 COLL VENOUS BLD VENIPUNCTURE: CPT

## 2021-12-11 PROCEDURE — 84702 CHORIONIC GONADOTROPIN TEST: CPT

## 2021-12-11 PROCEDURE — 81003 URINALYSIS AUTO W/O SCOPE: CPT

## 2021-12-11 PROCEDURE — 84481 FREE ASSAY (FT-3): CPT

## 2021-12-11 PROCEDURE — 85025 COMPLETE CBC W/AUTO DIFF WBC: CPT

## 2021-12-11 PROCEDURE — 83690 ASSAY OF LIPASE: CPT

## 2021-12-11 PROCEDURE — 86694 HERPES SIMPLEX NES ANTBDY: CPT

## 2021-12-11 PROCEDURE — 84436 ASSAY OF TOTAL THYROXINE: CPT

## 2021-12-11 PROCEDURE — 76817 TRANSVAGINAL US OBSTETRIC: CPT

## 2021-12-11 PROCEDURE — 86762 RUBELLA ANTIBODY: CPT

## 2021-12-11 PROCEDURE — 84439 ASSAY OF FREE THYROXINE: CPT

## 2021-12-11 PROCEDURE — 85730 THROMBOPLASTIN TIME PARTIAL: CPT

## 2021-12-11 PROCEDURE — 88300 SURGICAL PATH GROSS: CPT

## 2021-12-11 PROCEDURE — 76815 OB US LIMITED FETUS(S): CPT

## 2021-12-11 PROCEDURE — 86778 TOXOPLASMA ANTIBODY IGM: CPT

## 2021-12-11 PROCEDURE — 85384 FIBRINOGEN ACTIVITY: CPT

## 2021-12-11 PROCEDURE — 88300 SURGICAL PATH GROSS: CPT | Mod: 26

## 2021-12-11 PROCEDURE — 87635 SARS-COV-2 COVID-19 AMP PRB: CPT

## 2021-12-11 PROCEDURE — 80053 COMPREHEN METABOLIC PANEL: CPT

## 2021-12-11 PROCEDURE — 76815 OB US LIMITED FETUS(S): CPT | Mod: 26

## 2021-12-11 PROCEDURE — 86780 TREPONEMA PALLIDUM: CPT

## 2021-12-11 PROCEDURE — 76817 TRANSVAGINAL US OBSTETRIC: CPT | Mod: 26

## 2021-12-11 PROCEDURE — 86645 CMV ANTIBODY IGM: CPT

## 2021-12-11 RX ORDER — DIBUCAINE 1 %
1 OINTMENT (GRAM) RECTAL EVERY 6 HOURS
Refills: 0 | Status: DISCONTINUED | OUTPATIENT
Start: 2021-12-11 | End: 2021-12-11

## 2021-12-11 RX ORDER — PRAMOXINE HYDROCHLORIDE 150 MG/15G
1 AEROSOL, FOAM RECTAL EVERY 4 HOURS
Refills: 0 | Status: DISCONTINUED | OUTPATIENT
Start: 2021-12-11 | End: 2021-12-11

## 2021-12-11 RX ORDER — OXYTOCIN 10 UNIT/ML
333.33 VIAL (ML) INJECTION
Qty: 20 | Refills: 0 | Status: DISCONTINUED | OUTPATIENT
Start: 2021-12-11 | End: 2021-12-11

## 2021-12-11 RX ORDER — SODIUM CHLORIDE 9 MG/ML
3 INJECTION INTRAMUSCULAR; INTRAVENOUS; SUBCUTANEOUS EVERY 8 HOURS
Refills: 0 | Status: DISCONTINUED | OUTPATIENT
Start: 2021-12-11 | End: 2021-12-11

## 2021-12-11 RX ORDER — BENZOCAINE 10 %
1 GEL (GRAM) MUCOUS MEMBRANE EVERY 6 HOURS
Refills: 0 | Status: DISCONTINUED | OUTPATIENT
Start: 2021-12-11 | End: 2021-12-11

## 2021-12-11 RX ORDER — ONDANSETRON 8 MG/1
2 TABLET, FILM COATED ORAL ONCE
Refills: 0 | Status: DISCONTINUED | OUTPATIENT
Start: 2021-12-11 | End: 2021-12-11

## 2021-12-11 RX ORDER — TETANUS TOXOID, REDUCED DIPHTHERIA TOXOID AND ACELLULAR PERTUSSIS VACCINE, ADSORBED 5; 2.5; 8; 8; 2.5 [IU]/.5ML; [IU]/.5ML; UG/.5ML; UG/.5ML; UG/.5ML
0.5 SUSPENSION INTRAMUSCULAR ONCE
Refills: 0 | Status: DISCONTINUED | OUTPATIENT
Start: 2021-12-11 | End: 2021-12-11

## 2021-12-11 RX ORDER — KETOROLAC TROMETHAMINE 30 MG/ML
30 SYRINGE (ML) INJECTION ONCE
Refills: 0 | Status: DISCONTINUED | OUTPATIENT
Start: 2021-12-11 | End: 2021-12-11

## 2021-12-11 RX ORDER — SODIUM CHLORIDE 9 MG/ML
1000 INJECTION, SOLUTION INTRAVENOUS
Refills: 0 | Status: DISCONTINUED | OUTPATIENT
Start: 2021-12-11 | End: 2021-12-11

## 2021-12-11 RX ORDER — IBUPROFEN 200 MG
600 TABLET ORAL EVERY 6 HOURS
Refills: 0 | Status: DISCONTINUED | OUTPATIENT
Start: 2021-12-11 | End: 2021-12-11

## 2021-12-11 RX ORDER — BUTORPHANOL TARTRATE 2 MG/ML
2 INJECTION, SOLUTION INTRAMUSCULAR; INTRAVENOUS EVERY 4 HOURS
Refills: 0 | Status: DISCONTINUED | OUTPATIENT
Start: 2021-12-11 | End: 2021-12-11

## 2021-12-11 RX ORDER — AER TRAVELER 0.5 G/1
1 SOLUTION RECTAL; TOPICAL EVERY 4 HOURS
Refills: 0 | Status: DISCONTINUED | OUTPATIENT
Start: 2021-12-11 | End: 2021-12-11

## 2021-12-11 RX ORDER — FAMOTIDINE 10 MG/ML
20 INJECTION INTRAVENOUS ONCE
Refills: 0 | Status: DISCONTINUED | OUTPATIENT
Start: 2021-12-11 | End: 2021-12-11

## 2021-12-11 RX ORDER — SIMETHICONE 80 MG/1
80 TABLET, CHEWABLE ORAL EVERY 4 HOURS
Refills: 0 | Status: DISCONTINUED | OUTPATIENT
Start: 2021-12-11 | End: 2021-12-11

## 2021-12-11 RX ORDER — HYDROCORTISONE 1 %
1 OINTMENT (GRAM) TOPICAL EVERY 6 HOURS
Refills: 0 | Status: DISCONTINUED | OUTPATIENT
Start: 2021-12-11 | End: 2021-12-11

## 2021-12-11 RX ORDER — LANOLIN
1 OINTMENT (GRAM) TOPICAL EVERY 6 HOURS
Refills: 0 | Status: DISCONTINUED | OUTPATIENT
Start: 2021-12-11 | End: 2021-12-11

## 2021-12-11 RX ORDER — ACETAMINOPHEN 500 MG
975 TABLET ORAL
Refills: 0 | Status: DISCONTINUED | OUTPATIENT
Start: 2021-12-11 | End: 2021-12-11

## 2021-12-11 RX ORDER — DIPHENHYDRAMINE HCL 50 MG
25 CAPSULE ORAL EVERY 6 HOURS
Refills: 0 | Status: DISCONTINUED | OUTPATIENT
Start: 2021-12-11 | End: 2021-12-11

## 2021-12-11 RX ORDER — MAGNESIUM HYDROXIDE 400 MG/1
30 TABLET, CHEWABLE ORAL
Refills: 0 | Status: DISCONTINUED | OUTPATIENT
Start: 2021-12-11 | End: 2021-12-11

## 2021-12-11 RX ORDER — OXYCODONE HYDROCHLORIDE 5 MG/1
5 TABLET ORAL
Refills: 0 | Status: DISCONTINUED | OUTPATIENT
Start: 2021-12-11 | End: 2021-12-11

## 2021-12-11 RX ORDER — OXYCODONE HYDROCHLORIDE 5 MG/1
5 TABLET ORAL ONCE
Refills: 0 | Status: DISCONTINUED | OUTPATIENT
Start: 2021-12-11 | End: 2021-12-11

## 2021-12-11 RX ADMIN — SODIUM CHLORIDE 3 MILLILITER(S): 9 INJECTION INTRAMUSCULAR; INTRAVENOUS; SUBCUTANEOUS at 17:00

## 2021-12-11 RX ADMIN — BUTORPHANOL TARTRATE 2 MILLIGRAM(S): 2 INJECTION, SOLUTION INTRAMUSCULAR; INTRAVENOUS at 18:15

## 2021-12-11 RX ADMIN — SODIUM CHLORIDE 125 MILLILITER(S): 9 INJECTION, SOLUTION INTRAVENOUS at 06:27

## 2021-12-11 RX ADMIN — BUTORPHANOL TARTRATE 2 MILLIGRAM(S): 2 INJECTION, SOLUTION INTRAMUSCULAR; INTRAVENOUS at 16:46

## 2021-12-11 RX ADMIN — Medication 1000 MILLIUNIT(S)/MIN: at 18:18

## 2021-12-11 NOTE — OB PROVIDER H&P - HISTORY OF PRESENT ILLNESS
25yo  at 18w1d presented to the [facility] ED with gush of clear fluid. To the date patient reported uncomplicated prenatal course.  Patient with obvious rupture amniotic membranes with resulting anhydramnios on the ED ultrasound.    G1 - 2012 - early term  in [facility] male infant  G2 - 2021 - 8 weeks miscarriage  G3 - current pregnancy     27yo  at 18w1d presented to the [facility] ED with gush of clear fluid. To the date patient reported uncomplicated prenatal course.  Patient with obvious rupture amniotic membranes with resulting anhydramnios on the ED ultrasound.    OBhx:  G1 - 2012 - early term  in [facility] male infant  G2 - 2021 - 8 weeks miscarriage  G3 - current pregnancy  GynHx: PCOS. Danny hx of fibroids, abnormal paps and STIs  PMH: denies  Meds: multivitamin  All: NKDA  SurgHx: denies  SocialHx: denies EtOH, tobacco and illicit drug use

## 2021-12-11 NOTE — OB PROVIDER H&P - ATTENDING COMMENTS
25 y/o  at [id number] here with previable PPROM. She reports loss of water around 4 pm yesterday. Denies any ctx's. Reports uncomplicated pregnancy, except for one episode of bleeding at 8n weeks. Low risk NIPT and normal NT. Sono from radiology department shows anhydramnios.     -admit  -High dose protocol of cytotec for IOL at 18 weeks  -Regular diet  -will send placenta for delivery  -TORCH panel as indicated  -we discussed findings extensively, and she understands at 18 weeks IOL is indicated as fetus is previable and even more dire with no fluid, risks of infection, placental abruption and other complication discussed  -consents explained in detail and signed  -routine admission orders  ppalos

## 2021-12-11 NOTE — OB PROVIDER H&P - NSHPPHYSICALEXAM_GEN_ALL_CORE
T(C): 37 (12-11-21 @ 05:19), Max: 37 (12-11-21 @ 05:19)  HR: 87 (12-11-21 @ 05:48) (73 - 92)  BP: 113/69 (12-11-21 @ 05:48) (108/70 - 126/83)  RR: 16 (12-11-21 @ 05:19) (16 - 20)  SpO2: 99% (12-11-21 @ 04:44) (98% - 99%)  Gen: NAD, well-appearing  Heart: S1 S2, RRR  Lungs: CTAB  Abd: soft, gravid  Ext: non-edematous, non-tender   SVE:   SSE: cervix visualized, closed and without any signs of bleeding or drainage, no pooling   [place] monitoring: no contractions T(C): 37 (12-11-21 @ 05:19), Max: 37 (12-11-21 @ 05:19)  HR: 87 (12-11-21 @ 05:48) (73 - 92)  BP: 113/69 (12-11-21 @ 05:48) (108/70 - 126/83)  RR: 16 (12-11-21 @ 05:19) (16 - 20)  SpO2: 99% (12-11-21 @ 04:44) (98% - 99%)    Gen: NAD, well-appearing  Heart: S1 S2, RRR  Lungs: CTAB  Abd: soft, gravid  Ext: non-edematous, non-tender   SVE: 1/30/-3  SSE: gross pooling noted on exam.   [place] monitoring: no contractions T(C): 37 (12-11-21 @ 05:19), Max: 37 (12-11-21 @ 05:19)  HR: 87 (12-11-21 @ 05:48) (73 - 92)  BP: 113/69 (12-11-21 @ 05:48) (108/70 - 126/83)  RR: 16 (12-11-21 @ 05:19) (16 - 20)  SpO2: 99% (12-11-21 @ 04:44) (98% - 99%)    Gen: NAD, well-appearing  Heart: S1 S2, RRR  Lungs: CTAB  Abd: soft, gravid  Ext: non-edematous, non-tender     SVE: 1/30/-3  SSE: gross pooling noted on exam.   [place] monitoring: no contractions

## 2021-12-11 NOTE — OB PROVIDER H&P - NSHPLABSRESULTS_GEN_ALL_CORE
25yo  at 18w1d with ruptured amniotic membranes, nonviable pregnancy in early second trimester  -admission  -routine labs  -TORCH labs  -400 cytotec buccally q4h  -Stadol 2ml q4h prn pain 27yo  at 18w1d with ruptured amniotic membranes, nonviable pregnancy in early second trimester  -admission  -routine labs  -TORCH labs  -400 cytotec buccally q4h  -Stadol 2ml q4h prn pain  epidural as needed admission lab

## 2021-12-11 NOTE — OB PROVIDER H&P - ASSESSMENT
27yo  at 18w1d with ruptured amniotic membranes, nonviable pregnancy in early second trimester  -admission  -routine labs  -TORCH labs  -400 cytotec buccally q4h  -Stadol 2ml q4h prn pain    epidural as needed

## 2021-12-11 NOTE — OB PROVIDER DELIVERY SUMMARY - NSSELHIDDEN_OBGYN_ALL_OB_FT
[NS_DeliveryAttending1_OBGYN_ALL_OB_FT:[id number]],[NS_DeliveryAssist1_OBGYN_ALL_OB_FT:[id number]=]

## 2021-12-11 NOTE — DISCHARGE NOTE OB - HOSPITAL COURSE
Patient underwent a fetal demise at 17w5d. Post-partum course was uncomplicated. Pain is well controlled with PRN medication. She has no difficulty with ambulation, voiding, or PO intake. Lab values and vital signs are within normal limits prior to discharge.

## 2021-12-11 NOTE — DISCHARGE NOTE OB - PLAN OF CARE
1) Please take ibuprofen and/or Tylenol as needed for pain as prescribed.  2) Nothing in the vagina for 6 weeks (including no sex, no tampons, and no douching).  3) Please call your doctor for a follow up your postpartum appointment in 1-2 weeks.  4) Please continue taking vitamins postpartum. Take iron and colace for acute blood loss anemia.  5) Please call the office sooner if you have heavy vaginal bleeding, severe abdominal pain, or fever > 100.4F.  6) You may resume regular daily activity as tolerated

## 2021-12-11 NOTE — DISCHARGE NOTE OB - CARE PLAN
1 Principal Discharge DX:	Fetal demise affecting delivery  Assessment and plan of treatment:	1) Please take ibuprofen and/or Tylenol as needed for pain as prescribed.  2) Nothing in the vagina for 6 weeks (including no sex, no tampons, and no douching).  3) Please call your doctor for a follow up your postpartum appointment in 1-2 weeks.  4) Please continue taking vitamins postpartum. Take iron and colace for acute blood loss anemia.  5) Please call the office sooner if you have heavy vaginal bleeding, severe abdominal pain, or fever > 100.4F.  6) You may resume regular daily activity as tolerated

## 2021-12-11 NOTE — PROGRESS NOTE ADULT - SUBJECTIVE AND OBJECTIVE BOX
S: Patient doing well. Minimal lochia. No complaints.    O: Vital Signs Last 24 Hrs  T(C): 37.4 (11 Dec 2021 16:49), Max: 37.4 (11 Dec 2021 16:49)  T(F): 99.32 (11 Dec 2021 16:49), Max: 99.32 (11 Dec 2021 16:49)  HR: 69 (11 Dec 2021 21:03) (63 - 101)  BP: 103/59 (11 Dec 2021 21:03) (96/57 - 124/78)  BP(mean): --  RR: 16 (11 Dec 2021 18:53) (16 - 16)  SpO2: 99% (11 Dec 2021 04:44) (99% - 99%)    Gen: NAD  Abd: soft, NT, ND, fundus firm below umbilicus  Ext: no tenderness    Labs:                        11.4   10.92 )-----------( 246      ( 11 Dec 2021 06:46 )             33.0     Antibody Screen: NEG (12-11 @ 06:46)       A/P PP # 0  Doing well.  Routine post partum care.  Discharge home ,patient requesting to go home.   Will make appointment at [facility] in 2 wks

## 2021-12-11 NOTE — OB RN DELIVERY SUMMARY - NSSELHIDDEN_OBGYN_ALL_OB_FT
[NS_DeliveryAttending1_OBGYN_ALL_OB_FT:[id number]=],[NS_DeliveryRN_OBGYN_ALL_OB_FT:[id number]=]

## 2021-12-11 NOTE — OB PROVIDER DELIVERY SUMMARY - NSPROVIDERDELIVERYNOTE_OBGYN_ALL_OB_FT
26y  presenting for PPROM at 18w1d     at 1717 of a nonviable female infant. with Apgars 9/9. Infant and placenta delivered with maternal expulsive efforts. Pitocin started. Excellent hemostasis achieved. Uterus, cervix, perineum and vagina were inspected and no lacerations were noted. EBL 79cc. Pt tolerated procedure well, in stable condition, recovering in LDR. Infant in LDR. Instrument/sponge count correct x 2 and confirmed by nurse. 26y  presenting for PPROM at 17w5d     at 1717 of a nonviable female infant. Infant and placenta delivered with maternal expulsive efforts. Pitocin started. Excellent hemostasis achieved. Uterus, cervix, perineum and vagina were inspected and no lacerations were noted. EBL 79cc. Pt tolerated procedure well, in stable condition, recovering in LDR. Infant in LDR. Instrument/sponge count correct x 2 and confirmed by nurse.

## 2021-12-11 NOTE — DISCHARGE NOTE OB - NS MD DC FALL RISK RISK
For information on Fall & Injury Prevention, visit: [email or web address] OR  [email or web address] OR  https://www.cdc.gov/steadi/patient.html

## 2021-12-11 NOTE — OB PROVIDER LABOR PROGRESS NOTE - ASSESSMENT
Pt admit for fetal demise at 18w1d.  400mg vaginal cytotec placed.   Patient denies pain or cramping at this time.    Discussed with Dr. Rivera

## 2021-12-11 NOTE — DISCHARGE NOTE OB - CARE PROVIDER_API CALL
Sarah Rivera)  Obstetrics and Gynecology  [address] [place] [address]  Phone: [phone]  Fax: [phone]  Follow Up Time:

## 2021-12-11 NOTE — OB RN PATIENT PROFILE - NSICDXPASTMEDICALHX_GEN_ALL_CORE_FT
PAST MEDICAL HISTORY:  PCOS (polycystic ovarian syndrome)      PAST MEDICAL HISTORY:  PCOS (polycystic ovarian syndrome)     SAB (spontaneous ) 2021

## 2021-12-11 NOTE — DISCHARGE NOTE OB - PATIENT PORTAL LINK FT
You can access the FollowMyHealth Patient Portal offered by [facility] by registering at the following website: [email or web address]. By joining [employer]’s FollowMyHealth portal, you will also be able to view your health information using other applications (apps) compatible with our system.

## 2021-12-13 LAB
CMV IGM SERPL-ACNC: <30 AU/ML — SIGNIFICANT CHANGE UP (ref 0–29.9)
HSV 1+2 IGM SER-IMP: <0.91 RATIO — SIGNIFICANT CHANGE UP (ref 0–0.9)
MEV IGM SER-ACNC: <20 AU/ML — SIGNIFICANT CHANGE UP (ref 0–19.9)
T GONDII IGM SER IA-ACNC: <3 AU/ML — SIGNIFICANT CHANGE UP (ref 0–7.9)

## 2021-12-21 LAB — SURGICAL PATHOLOGY STUDY: SIGNIFICANT CHANGE UP

## 2022-08-17 PROBLEM — O03.9 COMPLETE OR UNSPECIFIED SPONTANEOUS ABORTION WITHOUT COMPLICATION: Chronic | Status: ACTIVE | Noted: 2021-12-11

## 2022-09-02 ENCOUNTER — APPOINTMENT (OUTPATIENT)
Dept: MATERNAL FETAL MEDICINE | Facility: CLINIC | Age: 27
End: 2022-09-02

## 2022-09-02 ENCOUNTER — APPOINTMENT (OUTPATIENT)
Dept: ANTEPARTUM | Facility: CLINIC | Age: 27
End: 2022-09-02

## 2022-09-02 VITALS
RESPIRATION RATE: 16 BRPM | BODY MASS INDEX: 29.25 KG/M2 | SYSTOLIC BLOOD PRESSURE: 112 MMHG | WEIGHT: 149 LBS | DIASTOLIC BLOOD PRESSURE: 64 MMHG | HEART RATE: 88 BPM | HEIGHT: 60 IN | OXYGEN SATURATION: 98 %

## 2022-09-02 DIAGNOSIS — Z31.69 ENCOUNTER FOR OTHER GENERAL COUNSELING AND ADVICE ON PROCREATION: ICD-10-CM

## 2022-09-02 DIAGNOSIS — Z78.9 OTHER SPECIFIED HEALTH STATUS: ICD-10-CM

## 2022-09-02 PROCEDURE — 99205 OFFICE O/P NEW HI 60 MIN: CPT | Mod: TH

## 2022-09-02 RX ORDER — FOLIC ACID 1 MG/1
1 TABLET ORAL DAILY
Qty: 30 | Refills: 4 | Status: ACTIVE | COMMUNITY
Start: 2022-09-02 | End: 1900-01-01

## 2022-09-02 RX ORDER — NORGESTIMATE-ETHINYL ESTRADIOL 7DAYSX3 21
TABLET ORAL
Refills: 0 | Status: ACTIVE | COMMUNITY

## 2022-09-02 RX ORDER — PRENATAL VIT NO.126/IRON/FOLIC 28MG-0.8MG
28-0.8 TABLET ORAL
Qty: 30 | Refills: 5 | Status: ACTIVE | COMMUNITY
Start: 2022-09-02 | End: 1900-01-01

## 2022-09-02 NOTE — FAMILY HISTORY
[Age 35+ During Pregnancy] : not 35 or over during pregnancy [Reported Family History Of Birth Defects] : no congenital heart defects [Heriberto-Sachs Carrier] : no Heriberto-Sachs [Family History] : no mental retardation/autism [Reported Family History Of Genetic Disease] : no history of child defect in child of baby father

## 2022-09-02 NOTE — DISCUSSION/SUMMARY
[FreeTextEntry1] : She is a 27 year-old  3 para 1-0-2-1 LMP 8/27/2022  x 5 days.  She states that she uses birth control pills because she was recently diagnosed with ovarian cyst.  She was advised to continue using oral contraceptives for 3 months to treat the ovarian cysts. She also uses condoms for birth control.\par \par She presents to have a preconception M consultation because she had a first trimester spontaneous  and a second trimester spontaneous .  Her obstetrical history reveals a full term vaginal births in her native country of [place].  The father of the baby is not her current partner.  Her second pregnancy resulted in an 8-week spontaneous .  Her third pregnancy was complicated by prelabor premature rupture of membranes at 17 weeks gestation.  She had a labor induction to remove the products of conception from the uterus. The fetus and the placenta were sent to pathology.  The pathology report was unremarkable. There was no chorioamnionitis. \par \par I told her that most miscarriages are due to chance phenomenon. I told her that approximately 5% of women of reproductive age will have two or more recurrent abortions. The likelihood of other pregnancies resulting in a spontaneous  is approximately 25 to 30% regardless of the number of previous abortions.  I told her that the workup for recurrent miscarriages includes but is not limited to chromosome analysis of parents, thyroid function studies, prolactin level, hemoglobin A1C level, lupus anticoagulant, anticardiolipin antibodies, beta 2 glycoprotein 1 antibodies. I also told her that preconception cultures for bacterial infections or viruses have not been proven to be beneficial and, therefore, are not recommended in the evaluation of recurrent early pregnancy loss.  She would like to have chromosomal analysis since the miscarriages have occurred with a different partner. She was advised to consider having universal carrier screen testing which tests for approximately 100 clinically significant recessive and X-linked disorders.  I ordered the recommended laboratory tests. \par \par I told her that women with chronic medical conditions should have those conditions well controlled prior to becoming pregnant.  She told me that she does not have any chronic medical conditions.  She was advised to start taking a daily prenatal vitamin with iron and folic acid. I also advised her to take additional daily folic acid supplementation. I gave her a prescription for the prenatal vitamin and the additional folic acid supplementation. \par \par She was advised to make sure that all the adult immunizations are up to date. She was advised to review the status of her immunizations with her primary care provider. I told her that during pregnancy she should get the Tetanus- Diphtheria- Pertussis (Tdap) vaccine between 27 - 36 weeks of gestation to prevent the  infant from being infected with Pertussis. I also told her that I recommend for pregnant women to take the flu vaccine during the flu season.\par \par She was advised to not drink alcoholic beverages while trying to become pregnant; to avoid having an alcohol exposed conception. I also advised her to not drink alcohol during pregnancy to prevent having a baby with alcohol related birth defects and developmental disabilities involving mental capacity and behavioral problems (alcohol spectrum disorders). \par \par All questions were answered and she expressed understanding.\par \par \par .\par \par 
Stable.

## 2022-09-02 NOTE — OB HISTORY
[Definite:  ___ (Date)] : the last menstrual period was [unfilled] [Normal Amount/Duration] : was of a normal amount and duration [Frequency: Q ___ days] : menstrual periods occur approximately every [unfilled] days [Menarche Age: ____] : age at menarche was [unfilled] [Pregnancy History] : patient did not receive anesthesia [___] : no pregnancy complications reported [Spotting Between  Menses] : no spotting between menses [Menstrual Cramps] : no menstrual cramps [On BCP at conception] : the patient was not on BCP at conception

## 2022-09-06 ENCOUNTER — APPOINTMENT (OUTPATIENT)
Dept: MATERNAL FETAL MEDICINE | Facility: CLINIC | Age: 27
End: 2022-09-06

## 2022-09-06 LAB
APTT BLD: 33.1 SEC
C3 SERPL-MCNC: 124 MG/DL
C4 SERPL-MCNC: 24 MG/DL
CONFIRM: 32.2 SEC
DRVVT IMM 1:2 NP PPP: NORMAL
DRVVT SCREEN TO CONFIRM RATIO: 1.01 RATIO
ENA SS-A AB SER IA-ACNC: <0.2 AL
ENA SS-B AB SER IA-ACNC: 0.5 AL
ESTIMATED AVERAGE GLUCOSE: 94 MG/DL
HBA1C MFR BLD HPLC: 4.9 %
PROLACTIN SERPL-MCNC: 8.9 NG/ML
SCREEN DRVVT: 38.7 SEC
SILICA CLOTTING TIME INTERPRETATION: NORMAL
SILICA CLOTTING TIME S/C: 1 RATIO
T3FREE SERPL-MCNC: 3 PG/ML
T4 FREE SERPL-MCNC: 1.6 NG/DL
TSH SERPL-ACNC: 0.45 UIU/ML

## 2022-09-07 LAB
B2 GLYCOPROT1 AB SER QL: NEGATIVE
CARDIOLIPIN AB SER IA-ACNC: NEGATIVE
DSDNA AB SER-ACNC: <12 IU/ML

## 2023-05-22 ENCOUNTER — ASOB RESULT (OUTPATIENT)
Age: 28
End: 2023-05-22

## 2023-05-22 ENCOUNTER — APPOINTMENT (OUTPATIENT)
Dept: ANTEPARTUM | Facility: CLINIC | Age: 28
End: 2023-05-22
Payer: MEDICAID

## 2023-05-22 ENCOUNTER — APPOINTMENT (OUTPATIENT)
Dept: OBGYN | Facility: CLINIC | Age: 28
End: 2023-05-22
Payer: MEDICAID

## 2023-05-22 VITALS
WEIGHT: 153 LBS | SYSTOLIC BLOOD PRESSURE: 118 MMHG | HEIGHT: 60 IN | DIASTOLIC BLOOD PRESSURE: 66 MMHG | BODY MASS INDEX: 30.04 KG/M2

## 2023-05-22 DIAGNOSIS — Z12.4 ENCOUNTER FOR SCREENING FOR MALIGNANT NEOPLASM OF CERVIX: ICD-10-CM

## 2023-05-22 DIAGNOSIS — Z13.29 ENCOUNTER FOR SCREENING FOR OTHER SUSPECTED ENDOCRINE DISORDER: ICD-10-CM

## 2023-05-22 DIAGNOSIS — Z01.411 ENCOUNTER FOR GYNECOLOGICAL EXAMINATION (GENERAL) (ROUTINE) WITH ABNORMAL FINDINGS: ICD-10-CM

## 2023-05-22 DIAGNOSIS — Z11.1 ENCOUNTER FOR SCREENING FOR RESPIRATORY TUBERCULOSIS: ICD-10-CM

## 2023-05-22 DIAGNOSIS — Z34.91 ENCOUNTER FOR SUPERVISION OF NORMAL PREGNANCY, UNSPECIFIED, FIRST TRIMESTER: ICD-10-CM

## 2023-05-22 DIAGNOSIS — Z11.3 ENCOUNTER FOR SCREENING FOR INFECTIONS WITH A PREDOMINANTLY SEXUAL MODE OF TRANSMISSION: ICD-10-CM

## 2023-05-22 DIAGNOSIS — Z13.1 ENCOUNTER FOR SCREENING FOR DIABETES MELLITUS: ICD-10-CM

## 2023-05-22 DIAGNOSIS — Z11.4 ENCOUNTER FOR SCREENING FOR HUMAN IMMUNODEFICIENCY VIRUS [HIV]: ICD-10-CM

## 2023-05-22 DIAGNOSIS — Z87.59 PERSONAL HISTORY OF OTHER COMPLICATIONS OF PREGNANCY, CHILDBIRTH AND THE PUERPERIUM: ICD-10-CM

## 2023-05-22 LAB
BILIRUB UR QL STRIP: NORMAL
GLUCOSE UR-MCNC: NORMAL
HCG UR QL: 0.2 EU/DL
HGB UR QL STRIP.AUTO: NORMAL
KETONES UR-MCNC: NORMAL
LEUKOCYTE ESTERASE UR QL STRIP: NORMAL
NITRITE UR QL STRIP: NORMAL
PH UR STRIP: 7
PROT UR STRIP-MCNC: NORMAL
SP GR UR STRIP: 1.01

## 2023-05-22 PROCEDURE — 76817 TRANSVAGINAL US OBSTETRIC: CPT

## 2023-05-22 PROCEDURE — 99395 PREV VISIT EST AGE 18-39: CPT

## 2023-05-22 PROCEDURE — 99214 OFFICE O/P EST MOD 30 MIN: CPT | Mod: TH,25

## 2023-05-23 PROBLEM — Z11.3 SCREENING EXAMINATION FOR STD (SEXUALLY TRANSMITTED DISEASE): Status: ACTIVE | Noted: 2023-05-23

## 2023-05-23 PROBLEM — Z11.4 ENCOUNTER FOR SCREENING FOR HIV: Status: ACTIVE | Noted: 2023-05-23

## 2023-05-23 PROBLEM — Z01.411 ENCOUNTER FOR WELL WOMAN EXAM WITH ABNORMAL FINDINGS: Status: ACTIVE | Noted: 2023-05-23

## 2023-05-23 PROBLEM — Z13.1 SCREENING FOR DIABETES MELLITUS: Status: ACTIVE | Noted: 2023-05-23

## 2023-05-23 PROBLEM — Z11.1 SCREENING FOR TUBERCULOSIS: Status: ACTIVE | Noted: 2023-05-23

## 2023-05-23 PROBLEM — Z13.29 SCREENING FOR THYROID DISORDER: Status: ACTIVE | Noted: 2023-05-23

## 2023-05-23 LAB
ESTIMATED AVERAGE GLUCOSE: 97 MG/DL
HBA1C MFR BLD HPLC: 5 %

## 2023-05-23 NOTE — HISTORY OF PRESENT ILLNESS
[No] : Patient does not have concerns regarding sex [Currently Active] : currently active [Patient would like to be screened for STIs] : Patient would like to be screened for STIs [FreeTextEntry1] : Shannon is a  LMP 3/24/23 who presents for annual exam. She is without complaints. Denies pelvic pain, abnormal bleeding, or vaginal discharge. Denies issues with bowel or bladder function. \par Her last pregnancy in  results in 17 week PPPROM with IOL. Path did not show chorio. She had preconception consultation on 22. \par She is sexually active with male partner (s). Denies pain with intercourse. She is sexually satisfied. \par Lives with family. Works as unemployed. Feels safe at home. Denies depression/abuse. \par Sono showing SLIUP consistent with LMP. \par

## 2023-05-23 NOTE — PHYSICAL EXAM
SCHEDULED SX AND FOLLOW UP   EMAILED INFO [email or web address] [Chaperone Present] : A chaperone was present in the examining room during all aspects of the physical examination [FreeTextEntry1] : zach [Appropriately responsive] : appropriately responsive [Alert] : alert [No Acute Distress] : no acute distress [No Lymphadenopathy] : no lymphadenopathy [Soft] : soft [Non-tender] : non-tender [Non-distended] : non-distended [Oriented x3] : oriented x3 [Examination Of The Breasts] : a normal appearance [No Discharge] : no discharge [No Masses] : no breast masses were palpable [Labia Majora] : normal [Labia Minora] : normal [No Bleeding] : There was no active vaginal bleeding [Normal] : normal [Uterine Adnexae] : non-palpable

## 2023-05-23 NOTE — DISCUSSION/SUMMARY
[FreeTextEntry1] : RTO 4 weeks for NIPT/NT/universal carrier screening\par Miscarriage precautions reviewed. MFM consult recommended. \par Daily PNV recommended. New OB labs today. Bloodwork drawn today in office. \par \par   The importance of safer-sex was discussed with the patient.\par We reviewed ASCCP/ACOG guidelines for pap smears. Pap collected today.

## 2023-05-29 LAB
ABO + RH PNL BLD: NORMAL
BACTERIA UR CULT: NORMAL
BLD GP AB SCN SERPL QL: NORMAL
C TRACH RRNA SPEC QL NAA+PROBE: NOT DETECTED
C TRACH RRNA SPEC QL NAA+PROBE: NOT DETECTED
CYTOLOGY CVX/VAG DOC THIN PREP: NORMAL
HBV SURFACE AG SER QL: NONREACTIVE
HCV AB SER QL: NONREACTIVE
HCV S/CO RATIO: 0.11 S/CO
HGB A MFR BLD: 97.5 %
HGB A2 MFR BLD: 2.5 %
HGB FRACT BLD-IMP: NORMAL
HIV1+2 AB SPEC QL IA.RAPID: NONREACTIVE
M TB IFN-G BLD-IMP: NEGATIVE
MEV IGG FLD QL IA: 25.4 AU/ML
MEV IGG+IGM SER-IMP: POSITIVE
N GONORRHOEA RRNA SPEC QL NAA+PROBE: NOT DETECTED
N GONORRHOEA RRNA SPEC QL NAA+PROBE: NOT DETECTED
QUANTIFERON TB PLUS MITOGEN MINUS NIL: >10 IU/ML
QUANTIFERON TB PLUS NIL: 0.03 IU/ML
QUANTIFERON TB PLUS TB1 MINUS NIL: -0.01 IU/ML
QUANTIFERON TB PLUS TB2 MINUS NIL: -0.01 IU/ML
RUBV IGG FLD-ACNC: 5.4 INDEX
RUBV IGG SER-IMP: POSITIVE
SOURCE AMPLIFICATION: NORMAL
SOURCE TP AMPLIFICATION: NORMAL
T PALLIDUM AB SER QL IA: NEGATIVE
TSH SERPL-ACNC: 1.05 UIU/ML
VZV AB TITR SER: POSITIVE
VZV IGG SER IF-ACNC: 685.2 INDEX

## 2023-06-19 ENCOUNTER — NON-APPOINTMENT (OUTPATIENT)
Age: 28
End: 2023-06-19

## 2023-06-20 ENCOUNTER — ASOB RESULT (OUTPATIENT)
Age: 28
End: 2023-06-20

## 2023-06-20 ENCOUNTER — APPOINTMENT (OUTPATIENT)
Dept: OBGYN | Facility: CLINIC | Age: 28
End: 2023-06-20
Payer: MEDICAID

## 2023-06-20 ENCOUNTER — APPOINTMENT (OUTPATIENT)
Dept: ANTEPARTUM | Facility: CLINIC | Age: 28
End: 2023-06-20
Payer: MEDICAID

## 2023-06-20 VITALS
BODY MASS INDEX: 29.92 KG/M2 | HEIGHT: 60 IN | DIASTOLIC BLOOD PRESSURE: 60 MMHG | WEIGHT: 152.38 LBS | SYSTOLIC BLOOD PRESSURE: 104 MMHG

## 2023-06-20 DIAGNOSIS — Z34.91 ENCOUNTER FOR SUPERVISION OF NORMAL PREGNANCY, UNSPECIFIED, FIRST TRIMESTER: ICD-10-CM

## 2023-06-20 LAB
BILIRUB UR QL STRIP: NORMAL
GLUCOSE UR-MCNC: NORMAL
HCG UR QL: 0.2 EU/DL
HGB UR QL STRIP.AUTO: NORMAL
KETONES UR-MCNC: NORMAL
LEUKOCYTE ESTERASE UR QL STRIP: NORMAL
NITRITE UR QL STRIP: NORMAL
PH UR STRIP: 6
PROT UR STRIP-MCNC: NORMAL
SP GR UR STRIP: 1

## 2023-06-20 PROCEDURE — 99213 OFFICE O/P EST LOW 20 MIN: CPT | Mod: TH

## 2023-06-20 PROCEDURE — 76813 OB US NUCHAL MEAS 1 GEST: CPT

## 2023-06-20 PROCEDURE — 81003 URINALYSIS AUTO W/O SCOPE: CPT | Mod: QW

## 2023-06-21 LAB
HGB A MFR BLD: 97.5 %
HGB A2 MFR BLD: 2.5 %
HGB FRACT BLD-IMP: NORMAL

## 2023-06-24 LAB
ADDITIONAL US: NORMAL
COMMENTS: AFP: NORMAL
CRL SCAN TWIN B: NORMAL
CRL SCAN: NORMAL
CROWN RUMP LENGTH TWIN B: NORMAL
CROWN RUMP LENGTH: 53.5 MM
DOWN SYNDROME AGE RISK: NORMAL
DOWN SYNDROME INTERPRETATION: NORMAL
DOWN SYNDROME SCREENING RISK: NORMAL
GEST. AGE ON COLLECTION DATE: 11.9 WEEKS
HCG MOM: 0.87
HCG VALUE: 93.7 IU/ML
MATERNAL AGE AT EDD: 27.9 YR
NOTE: AFP: NORMAL
NT MOM TWIN B: NORMAL
NT TWIN B: NORMAL
NUCHAL TRANSLUCENCY (NT): 1.4 MM
NUCHAL TRANSLUCENCY MOM: 1
NUMBER OF FETUSES: 1
PAPP-A MOM: 0.7
PAPP-A VALUE: 538.4 NG/ML
RACE: NORMAL
RESULTS AFP: NORMAL
SONOGRAPHER ID#: NORMAL
SUBMIT PART 2 SAMPLE USING: NORMAL
TEST RESULTS: AFP: NORMAL
TRISOMY 18 AGE RISK: NORMAL
TRISOMY 18 INTERPRETATION: NORMAL
TRISOMY 18 SCREENING RISK: NORMAL
WEIGHT AFP: 153 LBS

## 2023-06-27 LAB
AR GENE MUT ANL BLD/T: NORMAL
FMR1 GENE MUT ANL BLD/T: NORMAL

## 2023-07-01 LAB — CFTR MUT TESTED BLD/T: NEGATIVE

## 2023-07-17 ENCOUNTER — ASOB RESULT (OUTPATIENT)
Age: 28
End: 2023-07-17

## 2023-07-17 ENCOUNTER — APPOINTMENT (OUTPATIENT)
Dept: ANTEPARTUM | Facility: CLINIC | Age: 28
End: 2023-07-17
Payer: MEDICAID

## 2023-07-17 PROCEDURE — 76817 TRANSVAGINAL US OBSTETRIC: CPT

## 2023-07-19 ENCOUNTER — NON-APPOINTMENT (OUTPATIENT)
Age: 28
End: 2023-07-19

## 2023-07-19 ENCOUNTER — APPOINTMENT (OUTPATIENT)
Dept: OBGYN | Facility: CLINIC | Age: 28
End: 2023-07-19
Payer: MEDICAID

## 2023-07-19 VITALS
DIASTOLIC BLOOD PRESSURE: 60 MMHG | BODY MASS INDEX: 30.46 KG/M2 | WEIGHT: 155.13 LBS | SYSTOLIC BLOOD PRESSURE: 102 MMHG | HEIGHT: 60 IN

## 2023-07-19 DIAGNOSIS — N96 RECURRENT PREGNANCY LOSS: ICD-10-CM

## 2023-07-19 PROCEDURE — 81003 URINALYSIS AUTO W/O SCOPE: CPT | Mod: QW

## 2023-07-19 PROCEDURE — 99213 OFFICE O/P EST LOW 20 MIN: CPT | Mod: TH

## 2023-07-26 LAB
AFP MOM: 0.63
AFP VALUE: 23.5 NG/ML
ALPHA FETOPROTEIN SERUM COMMENT: NORMAL
ALPHA FETOPROTEIN SERUM INTERPRETATION: NORMAL
ALPHA FETOPROTEIN SERUM RESULTS: NORMAL
ALPHA FETOPROTEIN SERUM TEST RESULTS: NORMAL
GESTATIONAL AGE BASED ON: NORMAL
GESTATIONAL AGE ON COLLECTION DATE: 16.7 WEEKS
INSULIN DEP DIABETES: NO
MATERNAL AGE AT EDD AFP: 27.8 YR
MULTIPLE GESTATION: NO
OSBR RISK 1 IN: NORMAL
RACE: NORMAL
WEIGHT AFP: 155 LBS

## 2023-07-31 ENCOUNTER — ASOB RESULT (OUTPATIENT)
Age: 28
End: 2023-07-31

## 2023-07-31 ENCOUNTER — APPOINTMENT (OUTPATIENT)
Dept: ANTEPARTUM | Facility: CLINIC | Age: 28
End: 2023-07-31
Payer: MEDICAID

## 2023-07-31 PROCEDURE — 76817 TRANSVAGINAL US OBSTETRIC: CPT

## 2023-07-31 PROCEDURE — 76816 OB US FOLLOW-UP PER FETUS: CPT

## 2023-08-12 NOTE — ED PROVIDER NOTE - PATIENT PORTAL LINK FT
no You can access the FollowMyHealth Patient Portal offered by [facility] by registering at the following website: [email or web address]. By joining [employer]’s FollowMyHealth portal, you will also be able to view your health information using other applications (apps) compatible with our system. Benzoyl Peroxide Counseling: Patient counseled that medicine may cause skin irritation and bleach clothing.  In the event of skin irritation, the patient was advised to reduce the amount of the drug applied or use it less frequently.   The patient verbalized understanding of the proper use and possible adverse effects of benzoyl peroxide.  All of the patient's questions and concerns were addressed.

## 2023-08-16 ENCOUNTER — APPOINTMENT (OUTPATIENT)
Dept: ANTEPARTUM | Facility: CLINIC | Age: 28
End: 2023-08-16
Payer: MEDICAID

## 2023-08-16 ENCOUNTER — OUTPATIENT (OUTPATIENT)
Dept: INPATIENT UNIT | Facility: HOSPITAL | Age: 28
LOS: 1 days | End: 2023-08-16
Payer: COMMERCIAL

## 2023-08-16 ENCOUNTER — ASOB RESULT (OUTPATIENT)
Age: 28
End: 2023-08-16

## 2023-08-16 VITALS — SYSTOLIC BLOOD PRESSURE: 138 MMHG | DIASTOLIC BLOOD PRESSURE: 83 MMHG | HEART RATE: 94 BPM

## 2023-08-16 VITALS
DIASTOLIC BLOOD PRESSURE: 75 MMHG | SYSTOLIC BLOOD PRESSURE: 121 MMHG | TEMPERATURE: 98 F | HEART RATE: 104 BPM | RESPIRATION RATE: 17 BRPM

## 2023-08-16 DIAGNOSIS — O99.212 OBESITY COMPLICATING PREGNANCY, SECOND TRIMESTER: ICD-10-CM

## 2023-08-16 DIAGNOSIS — O09.292 SUPERVISION OF PREGNANCY WITH OTHER POOR REPRODUCTIVE OR OBSTETRIC HISTORY, SECOND TRIMESTER: ICD-10-CM

## 2023-08-16 DIAGNOSIS — O26.899 OTHER SPECIFIED PREGNANCY RELATED CONDITIONS, UNSPECIFIED TRIMESTER: ICD-10-CM

## 2023-08-16 DIAGNOSIS — O26.872 CERVICAL SHORTENING, SECOND TRIMESTER: ICD-10-CM

## 2023-08-16 LAB
APPEARANCE UR: ABNORMAL
BACTERIA # UR AUTO: ABNORMAL
BILIRUB UR-MCNC: NEGATIVE — SIGNIFICANT CHANGE UP
BLD GP AB SCN SERPL QL: SIGNIFICANT CHANGE UP
COLOR SPEC: YELLOW — SIGNIFICANT CHANGE UP
DIFF PNL FLD: NEGATIVE — SIGNIFICANT CHANGE UP
EPI CELLS # UR: ABNORMAL
GLUCOSE UR QL: NEGATIVE MG/DL — SIGNIFICANT CHANGE UP
HCT VFR BLD CALC: 32.4 % — LOW (ref 34.5–45)
HGB BLD-MCNC: 11.7 G/DL — SIGNIFICANT CHANGE UP (ref 11.5–15.5)
KETONES UR-MCNC: NEGATIVE — SIGNIFICANT CHANGE UP
LEUKOCYTE ESTERASE UR-ACNC: NEGATIVE — SIGNIFICANT CHANGE UP
MCHC RBC-ENTMCNC: 33.2 PG — SIGNIFICANT CHANGE UP (ref 27–34)
MCHC RBC-ENTMCNC: 36.1 GM/DL — HIGH (ref 32–36)
MCV RBC AUTO: 92 FL — SIGNIFICANT CHANGE UP (ref 80–100)
NITRITE UR-MCNC: NEGATIVE — SIGNIFICANT CHANGE UP
PH UR: 7 — SIGNIFICANT CHANGE UP (ref 5–8)
PLATELET # BLD AUTO: 253 K/UL — SIGNIFICANT CHANGE UP (ref 150–400)
PROT UR-MCNC: NEGATIVE — SIGNIFICANT CHANGE UP
RBC # BLD: 3.52 M/UL — LOW (ref 3.8–5.2)
RBC # FLD: 12.4 % — SIGNIFICANT CHANGE UP (ref 10.3–14.5)
RBC CASTS # UR COMP ASSIST: SIGNIFICANT CHANGE UP /HPF (ref 0–4)
SP GR SPEC: 1 — LOW (ref 1.01–1.02)
UROBILINOGEN FLD QL: NEGATIVE MG/DL — SIGNIFICANT CHANGE UP
WBC # BLD: 13.9 K/UL — HIGH (ref 3.8–10.5)
WBC # FLD AUTO: 13.9 K/UL — HIGH (ref 3.8–10.5)
WBC UR QL: SIGNIFICANT CHANGE UP /HPF (ref 0–5)

## 2023-08-16 PROCEDURE — 87800 DETECT AGNT MULT DNA DIREC: CPT

## 2023-08-16 PROCEDURE — 87491 CHLMYD TRACH DNA AMP PROBE: CPT

## 2023-08-16 PROCEDURE — G0463: CPT

## 2023-08-16 PROCEDURE — 86900 BLOOD TYPING SEROLOGIC ABO: CPT

## 2023-08-16 PROCEDURE — 81001 URINALYSIS AUTO W/SCOPE: CPT

## 2023-08-16 PROCEDURE — 86850 RBC ANTIBODY SCREEN: CPT

## 2023-08-16 PROCEDURE — 86901 BLOOD TYPING SEROLOGIC RH(D): CPT

## 2023-08-16 PROCEDURE — 76811 OB US DETAILED SNGL FETUS: CPT

## 2023-08-16 PROCEDURE — 36415 COLL VENOUS BLD VENIPUNCTURE: CPT

## 2023-08-16 PROCEDURE — 99213 OFFICE O/P EST LOW 20 MIN: CPT | Mod: TH,25

## 2023-08-16 PROCEDURE — 59025 FETAL NON-STRESS TEST: CPT

## 2023-08-16 PROCEDURE — 85027 COMPLETE CBC AUTOMATED: CPT

## 2023-08-16 PROCEDURE — 87591 N.GONORRHOEAE DNA AMP PROB: CPT

## 2023-08-16 PROCEDURE — 76817 TRANSVAGINAL US OBSTETRIC: CPT | Mod: 59

## 2023-08-16 RX ORDER — DIPHENHYDRAMINE HCL 50 MG
2 CAPSULE ORAL
Refills: 0 | DISCHARGE

## 2023-08-16 NOTE — CONSULT NOTE ADULT - SUBJECTIVE AND OBJECTIVE BOX
Ms. Lux is 28 yr old  at 20w5d of gestation who was sent from the [facility] office for finding of short cervix on level II ultrasound today.  Patient's obstetrical history is significant for a 17 week pregnancy loss secondary to PPROM. She underwent cervical surveillance during this pregnancy and was noted to have a cervical length on 1.2cm, which was 3.5cm in the prior exam. The patient reports feeling well. She denies any abdominal pain, leakage of fluid, or vaginal bleeding. She reports good fetal movement. She also reports no fever, chills, vaginal discharge or trauma.     ICU Vital Signs Last 24 Hrs  T(C): 36.9 (16 Aug 2023 13:38), Max: 36.9 (16 Aug 2023 13:38)  T(F): 98.4 (16 Aug 2023 13:38), Max: 98.4 (16 Aug 2023 13:38)  HR: 94 (16 Aug 2023 18:11) (87 - 104)  BP: 138/83 (16 Aug 2023 18:11) (119/65 - 138/83)  BP(mean): --  ABP: --  ABP(mean): --  RR: 17 (16 Aug 2023 13:38) (17 - 17)  SpO2: --    O2 Parameters below as of 16 Aug 2023 13:38  Patient On (Oxygen Delivery Method): room air                              11.7   13.90 )-----------( 253      ( 16 Aug 2023 15:05 )             32.4     T(C): 36.9 (23 @ 13:38), Max: 36.9 (23 @ 13:38)  HR: 94 (23 @ 18:11) (87 - 104)  BP: 138/83 (23 @ 18:11) (119/65 - 138/83)  RR: 17 (23 @ 13:38) (17 - 17)  SpO2: --    CONSTITUTIONAL: Well groomed, no apparent distress  GI: Soft, NT, ND, No palpable contractions.   SSE: The cervix appears closed. about 1 cm in length No discharge. No bleeding. On bimanual exam: the cervix is closed, firm.

## 2023-08-16 NOTE — OB PROVIDER TRIAGE NOTE - NSHPPHYSICALEXAM_GEN_ALL_CORE
T(C): 36.9 (08-16-23 @ 13:38), Max: 36.9 (08-16-23 @ 13:38)  HR: 104 (08-16-23 @ 14:19) (104 - 104)  BP: 121/75 (08-16-23 @ 14:19) (121/75 - 121/75)  RR: 17 (08-16-23 @ 13:38) (17 - 17)    Gen: NAD, well-appearing  Heart: RRR  Lungs: CTAB  Abd: soft, gravid  Ext: non-edematous, non-tender   FHT: 130 baseline with moderate variability, +accels, no decels  [place]:acontractile T(C): 36.9 (08-16-23 @ 13:38), Max: 36.9 (08-16-23 @ 13:38)  HR: 104 (08-16-23 @ 14:19) (104 - 104)  BP: 121/75 (08-16-23 @ 14:19) (121/75 - 121/75)  RR: 17 (08-16-23 @ 13:38) (17 - 17)    Gen: NAD, well-appearing  Heart: RRR  Lungs: CTAB  Abd: soft, gravid  Ext: non-edematous, non-tender   SSE: cervix visualized, closed and without any signs of bleeding or drainage, no pooling   FHT: 140 baseline with moderate variability, +accels, no decels  [place]:acontractile

## 2023-08-16 NOTE — CONSULT NOTE ADULT - ATTENDING COMMENTS
[facility] - Consultation requested secondary to short cervix noted on outpatient ultrasound. Patient and  seen and evaluated in triage.? She is a 28 year old  at 20w5d sent in from the [facility] office for evaluation of short cervix (1.2 cm) noted at the time of routine anatomy evaluation.? Patient denies obstetrical complaint or any OB complication to date. Her obstetrical history is significant for prior previable delivery and she has had routine ultrasound surveillance in our office.  Last CL 3.5 cm at 18 weeks. No significant past medical or family history.? No history of cervical surgery, cervical biopsy?or abnormal pap smear.? In triage, no uterine activity noted on tocometer, normal WBC, UA negative.? We reviewed potential etiology of short cervix and recommendation for cerclage placement given CL <2.5 cm and history of /previable delivery.? We reviewed other options for pregnancy management including expectant management and vaginal progesterone. The risks, benefit,?alternatives?and limitations were reviewed and all questions answered.? She was also advised that?a short cervix may be indicative of intraamniotic infection that has not yet presented itself clinically.??Neither cerclage nor vaginal progesterone will prevent intraamniotic infection or  labor.? Finally, while the goal of therapy is to prolong pregnancy to term, both interventions may prolong pregnancy to the gestational age of periviability or extreme prematurity where the risk of long term morbidty are highest.? All questions were answered to the best of my ability.? After counseling, the patient opts for cerclage placement.  This was scheduled for 23.  Routine instructions were reviewed.  Marixa Bartlett MD  Maternal Fetal Medicine

## 2023-08-16 NOTE — OB PROVIDER TRIAGE NOTE - NSOBPROVIDERNOTE_OBGYN_ALL_OB_FT
A/P:   CHENTE HER is a 28y  at 20 weeks 5 days GA being evaluated for cervical insufficiency  - NST reactive  - acontractile  - MFM consult   Dispo: Continue to observe.     Discussed with  A/P:   CHENTE HER is a 28y  at 20 weeks 5 days GA being evaluated for cervical insufficiency  - NST reactive  - acontractile  - MFM consulted  -Will schedule cerclage for Friday   Dispo: Continue to observe.     Discussed with  A/P:   CHENTE HER is a 28y  at 20 weeks 5 days GA being evaluated for short cervix on US secondary to previous second trimester SAB  - FHR reassuring  - acontractile  - MFM consulted for cerclage placement for short cervix    Dispo: Preop labs obtained, cerclage placement scheduled for Friday.  Patient is stable for discharge to home. Differential diagnoses discussed with the patient. Return precautions given. Patient verbalized understanding and agreement with plan.      Discussed with

## 2023-08-16 NOTE — OB RN TRIAGE NOTE - NS_TRIAGEADDITIONAL COMMENTS_OBGYN_ALL_OB_FT
IV placed at 1455- saline locked, CBC and T&S sent IV placed at 1455- saline locked, CBC and T&S sent  IV removed [id number]

## 2023-08-16 NOTE — CONSULT NOTE ADULT - ASSESSMENT
Ms. Bruner is 28 year old  at 20w5d with short cervix and a h/o PTD for US indicated Cerclage placement     Ms. Ortega and her partner were counseled at bedside regarding cerclage placement vs. vaginal progesterone. She was counseled that the better option for her situation is a cerclage. She was informed that Cervical cerclage is a surgical procedure where we place a stitch around the  cervix to provide additional support and help prevent  labor or miscarriage. It's often recommended for individuals who have a history of cervical insufficiency, h/o PTD and exam or US finding of cervical shortening. She was counseled that a cerclage does not prevent  labor. She was informed that certain patients will experience  labor where the pregnancy is still previable and removal of the suture is necessary.  As for risks, like any surgical procedure, there are potential risks, such as infection, bleeding, rupture of membranes, or injury to the cervix. These risks are generally low. She was informed that she will require monitoring one to two weeks after the procedure     After the risks, benefits, and alternatives were discussed, the patient and her partner agreed to the procedure. The patient is scheduled for  at 12pm under spinal anesthesia    Labs were reviewed: Mild WBC, urine negative. vaginal swabs collected.   Tocometer: No contractions noted.     Patient assessed and counseled at bedside with Dr. Bartlett

## 2023-08-16 NOTE — OB RN TRIAGE NOTE - FALL HARM RISK - UNIVERSAL INTERVENTIONS
Bed in lowest position, wheels locked, appropriate side rails in place/Call bell, personal items and telephone in reach/Instruct patient to call for assistance before getting out of bed or chair/Non-slip footwear when patient is out of bed/[place] to call system/Physically safe environment - no spills, clutter or unnecessary equipment/Purposeful Proactive Rounding/Room/bathroom lighting operational, light cord in reach

## 2023-08-17 ENCOUNTER — TRANSCRIPTION ENCOUNTER (OUTPATIENT)
Age: 28
End: 2023-08-17

## 2023-08-17 DIAGNOSIS — O09.292 SUPERVISION OF PREGNANCY WITH OTHER POOR REPRODUCTIVE OR OBSTETRIC HISTORY, SECOND TRIMESTER: ICD-10-CM

## 2023-08-17 DIAGNOSIS — O99.282 ENDOCRINE, NUTRITIONAL AND METABOLIC DISEASES COMPLICATING PREGNANCY, SECOND TRIMESTER: ICD-10-CM

## 2023-08-17 DIAGNOSIS — O26.872 CERVICAL SHORTENING, SECOND TRIMESTER: ICD-10-CM

## 2023-08-17 DIAGNOSIS — O34.32 MATERNAL CARE FOR CERVICAL INCOMPETENCE, SECOND TRIMESTER: ICD-10-CM

## 2023-08-17 DIAGNOSIS — E28.2 POLYCYSTIC OVARIAN SYNDROME: ICD-10-CM

## 2023-08-17 DIAGNOSIS — Z3A.20 20 WEEKS GESTATION OF PREGNANCY: ICD-10-CM

## 2023-08-17 LAB
C TRACH RRNA SPEC QL NAA+PROBE: SIGNIFICANT CHANGE UP
CANDIDA AB TITR SER: SIGNIFICANT CHANGE UP
G VAGINALIS DNA SPEC QL NAA+PROBE: SIGNIFICANT CHANGE UP
N GONORRHOEA RRNA SPEC QL NAA+PROBE: SIGNIFICANT CHANGE UP
T VAGINALIS SPEC QL WET PREP: SIGNIFICANT CHANGE UP

## 2023-08-18 ENCOUNTER — TRANSCRIPTION ENCOUNTER (OUTPATIENT)
Age: 28
End: 2023-08-18

## 2023-08-18 ENCOUNTER — INPATIENT (INPATIENT)
Facility: HOSPITAL | Age: 28
LOS: 0 days | Discharge: HOME | DRG: 819 | End: 2023-08-19
Attending: OBSTETRICS & GYNECOLOGY | Admitting: OBSTETRICS & GYNECOLOGY
Payer: COMMERCIAL

## 2023-08-18 VITALS — HEIGHT: 60 IN | WEIGHT: 154.1 LBS

## 2023-08-18 VITALS — HEART RATE: 75 BPM | TEMPERATURE: 98 F

## 2023-08-18 DIAGNOSIS — O26.872 CERVICAL SHORTENING, SECOND TRIMESTER: ICD-10-CM

## 2023-08-18 DIAGNOSIS — O34.4: ICD-10-CM

## 2023-08-18 PROCEDURE — 99221 1ST HOSP IP/OBS SF/LOW 40: CPT

## 2023-08-18 PROCEDURE — 59320 REVISION OF CERVIX: CPT

## 2023-08-18 RX ORDER — INDOMETHACIN 50 MG
25 CAPSULE ORAL ONCE
Refills: 0 | Status: COMPLETED | OUTPATIENT
Start: 2023-08-18 | End: 2023-08-18

## 2023-08-18 RX ORDER — SODIUM CHLORIDE 9 MG/ML
1000 INJECTION, SOLUTION INTRAVENOUS ONCE
Refills: 0 | Status: COMPLETED | OUTPATIENT
Start: 2023-08-18 | End: 2023-08-18

## 2023-08-18 RX ORDER — INDOMETHACIN 50 MG
1 CAPSULE ORAL
Qty: 4 | Refills: 0
Start: 2023-08-18 | End: 2023-08-18

## 2023-08-18 RX ORDER — ACETAMINOPHEN 500 MG
1000 TABLET ORAL ONCE
Refills: 0 | Status: COMPLETED | OUTPATIENT
Start: 2023-08-18 | End: 2023-08-18

## 2023-08-18 RX ADMIN — Medication 1000 MILLIGRAM(S): at 14:20

## 2023-08-18 RX ADMIN — Medication 25 MILLIGRAM(S): at 12:40

## 2023-08-18 RX ADMIN — Medication 25 MILLIGRAM(S): at 11:43

## 2023-08-18 RX ADMIN — Medication 400 MILLIGRAM(S): at 13:58

## 2023-08-18 RX ADMIN — SODIUM CHLORIDE 1000 MILLILITER(S): 9 INJECTION, SOLUTION INTRAVENOUS at 11:44

## 2023-08-18 NOTE — DISCHARGE NOTE ANTEPARTUM - PATIENT PORTAL LINK FT
You can access the FollowMyHealth Patient Portal offered by [facility] by registering at the following website: [email or web address]. By joining [employer]’s FollowMyHealth portal, you will also be able to view your health information using other applications (apps) compatible with our system.

## 2023-08-18 NOTE — OB RN PATIENT PROFILE - PARENTS VERBALIZED UNDERSTANDING OF THE SAFE SKIN TO SKIN POSITIONING OF THE NEWBORN.
Patient was to get an MRI done she wants it done at [facility] but hasnt heard anything as far as getting it scheduled.  Please call Statement Selected

## 2023-08-18 NOTE — DISCHARGE NOTE ANTEPARTUM - PLAN OF CARE
1. Please continue to follow up with MFM and OBGYN offices. Return to MFM office in 2 weeks.   2. Use tylenol as needed for pain  3. Please return to L&D for leakage of fluid, vaginal bleeding and/or painful contractions.

## 2023-08-18 NOTE — OB PROVIDER H&P - HISTORY OF PRESENT ILLNESS
Ms. Lux is 28 yr old  at 21w0d of gestation admitted for an elective cervical cerclage placement.  Patient's obstetrical history is significant for a 17 week pregnancy loss secondary to PPROM. She underwent cervical surveillance during this pregnancy and was noted to have a cervical length on 1.2cm, which was 3.5cm in the prior exam. The patient reports feeling well. She denies any abdominal pain, leakage of fluid, or vaginal bleeding. She reports good fetal movement. She also reports no fever, chills, vaginal discharge or trauma.                           11.7   13.90 )-----------( 253      ( 16 Aug 2023 15:05 )             32.4     ICU Vital Signs Last 24 Hrs  T(C): --  T(F): --  HR: 96 (18 Aug 2023 11:56) (96 - 96)  BP: 106/58 (18 Aug 2023 11:56) (106/58 - 106/58)  BP(mean): --  ABP: --  ABP(mean): --  RR: --  SpO2: --

## 2023-08-18 NOTE — DISCHARGE NOTE ANTEPARTUM - CARE PROVIDER_API CALL
Young Maurice  Maternal/Fetal Medicine  [address] [place] [address], [address]  [place] [address]  Phone: [phone]  Fax: [phone]  Follow Up Time:

## 2023-08-18 NOTE — BRIEF OPERATIVE NOTE - NSICDXBRIEFPREOP_GEN_ALL_CORE_FT
PRE-OP DIAGNOSIS:  Cervical insufficiency in pregnancy, antepartum 18-Aug-2023 13:14:27  Jovanna De La Cruz

## 2023-08-18 NOTE — DISCHARGE NOTE ANTEPARTUM - HOSPITAL COURSE
Patient admitted for cervical cerclage in the setting of cervical insufficiency. Procedure was uncomplicated. FH was obtained after procedure. She has no difficulty with ambulation, voiding, or PO intake. Vital signs are within normal limits prior to discharge.

## 2023-08-18 NOTE — BRIEF OPERATIVE NOTE - NSICDXBRIEFPOSTOP_GEN_ALL_CORE_FT
POST-OP DIAGNOSIS:  Cervical insufficiency in pregnancy, antepartum 18-Aug-2023 13:14:34  Jovanna De La Cruz

## 2023-08-18 NOTE — OB PROVIDER H&P - ASSESSMENT
Ms. Bruner is 28 year old  at 21w0d with short cervix and a h/o PTD for US indicated Cerclage placement     Plan:   Admit to LD  Pain management: Indomethacin 50mg now    R/B/A discussed with patient   Anesthesia at bedside    Discussed with Dr. Maurice

## 2023-08-18 NOTE — OB RN INTRAOPERATIVE NOTE - NSOBSELHIDDEN_OBGYN_ALL_OB_FT
[NSRNCirculatorProcedure1_OBGYN_ALL_OB_FT:[id number]] [NSRNCirculatorProcedure1_OBGYN_ALL_OB_FT:[id number]],[NSOBAttendingProcedure1_OBGYN_ALL_OB_FT:[id number]=]

## 2023-08-18 NOTE — OB RN PATIENT PROFILE - NSICDXPASTMEDICALHX_GEN_ALL_CORE_FT
PAST MEDICAL HISTORY:  H/O induced      Miscarriage      (normal spontaneous vaginal delivery)     PCOS (polycystic ovarian syndrome)     SAB (spontaneous ) 2021

## 2023-08-18 NOTE — DISCHARGE NOTE ANTEPARTUM - MEDICATION SUMMARY - MEDICATIONS TO TAKE
I will START or STAY ON the medications listed below when I get home from the hospital:    vit B6  -- Indication: For Home med    indomethacin 25 mg oral capsule  -- 1 cap(s) by mouth every 6 hours  -- Indication: For cramping    Unisom SleepMelts 25 mg oral tablet, disintegrating  -- 2 orally  -- Indication: For Nausea    Prenatal 1 oral capsule  -- orally  -- Indication: For pregnancy

## 2023-08-18 NOTE — DISCHARGE NOTE ANTEPARTUM - CARE PLAN
Principal Discharge DX:	Short cervix, antepartum, second trimester  Assessment and plan of treatment:	1. Please continue to follow up with MFM and OBGYN offices. Return to MFM office in 2 weeks.   2. Use tylenol as needed for pain  3. Please return to L&D for leakage of fluid, vaginal bleeding and/or painful contractions.   1

## 2023-08-18 NOTE — OB RN PATIENT PROFILE - FALL HARM RISK - UNIVERSAL INTERVENTIONS
Bed in lowest position, wheels locked, appropriate side rails in place/Call bell, personal items and telephone in reach/Instruct patient to call for assistance before getting out of bed or chair/Non-slip footwear when patient is out of bed/[place] to call system/Physically safe environment - no spills, clutter or unnecessary equipment/Purposeful Proactive Rounding/Room/bathroom lighting operational, light cord in reach

## 2023-08-23 ENCOUNTER — APPOINTMENT (OUTPATIENT)
Dept: OBGYN | Facility: CLINIC | Age: 28
End: 2023-08-23
Payer: MEDICAID

## 2023-08-23 VITALS
WEIGHT: 158 LBS | BODY MASS INDEX: 31.02 KG/M2 | SYSTOLIC BLOOD PRESSURE: 110 MMHG | DIASTOLIC BLOOD PRESSURE: 70 MMHG | HEIGHT: 60 IN

## 2023-08-23 DIAGNOSIS — O21.9 VOMITING OF PREGNANCY, UNSPECIFIED: ICD-10-CM

## 2023-08-23 PROBLEM — O03.9 COMPLETE OR UNSPECIFIED SPONTANEOUS ABORTION WITHOUT COMPLICATION: Chronic | Status: ACTIVE | Noted: 2023-08-18

## 2023-08-23 PROBLEM — Z98.890 OTHER SPECIFIED POSTPROCEDURAL STATES: Chronic | Status: ACTIVE | Noted: 2023-08-18

## 2023-08-23 PROCEDURE — 99213 OFFICE O/P EST LOW 20 MIN: CPT | Mod: TH

## 2023-08-23 RX ORDER — ONDANSETRON 8 MG/1
8 TABLET, ORALLY DISINTEGRATING ORAL EVERY 8 HOURS
Qty: 20 | Refills: 3 | Status: ACTIVE | COMMUNITY
Start: 2023-08-23 | End: 1900-01-01

## 2023-08-24 LAB
BILIRUB UR QL STRIP: NORMAL
GLUCOSE UR-MCNC: NORMAL
HCG UR QL: 0.2 EU/DL
HGB UR QL STRIP.AUTO: ABNORMAL
KETONES UR-MCNC: ABNORMAL
LEUKOCYTE ESTERASE UR QL STRIP: ABNORMAL
NITRITE UR QL STRIP: NORMAL
PH UR STRIP: 5.5
PROT UR STRIP-MCNC: 30
SP GR UR STRIP: >=1.03

## 2023-08-28 NOTE — OB RN PATIENT PROFILE - NS_PARA_OBGYN_ALL_OB_NU
JK=781 bpm, YXTL=673/83 mmhg, SpO2=98.0 %, Resp=29 B/min, EtCO2=20 mmHg, Apnea=0 Seconds, Comment=ST 1

## 2023-08-30 ENCOUNTER — ASOB RESULT (OUTPATIENT)
Age: 28
End: 2023-08-30

## 2023-08-30 ENCOUNTER — APPOINTMENT (OUTPATIENT)
Dept: ANTEPARTUM | Facility: CLINIC | Age: 28
End: 2023-08-30
Payer: MEDICAID

## 2023-08-30 PROCEDURE — 76816 OB US FOLLOW-UP PER FETUS: CPT

## 2023-08-30 PROCEDURE — 76820 UMBILICAL ARTERY ECHO: CPT | Mod: 59

## 2023-08-30 PROCEDURE — 76819 FETAL BIOPHYS PROFIL W/O NST: CPT | Mod: 59

## 2023-09-13 ENCOUNTER — ASOB RESULT (OUTPATIENT)
Age: 28
End: 2023-09-13

## 2023-09-13 ENCOUNTER — APPOINTMENT (OUTPATIENT)
Dept: ANTEPARTUM | Facility: CLINIC | Age: 28
End: 2023-09-13
Payer: MEDICAID

## 2023-09-13 PROCEDURE — 76816 OB US FOLLOW-UP PER FETUS: CPT

## 2023-09-13 PROCEDURE — 76817 TRANSVAGINAL US OBSTETRIC: CPT

## 2023-09-14 DIAGNOSIS — O26.879 CERVICAL SHORTENING, UNSPECIFIED TRIMESTER: ICD-10-CM

## 2023-09-20 ENCOUNTER — NON-APPOINTMENT (OUTPATIENT)
Age: 28
End: 2023-09-20

## 2023-09-20 ENCOUNTER — APPOINTMENT (OUTPATIENT)
Dept: ANTEPARTUM | Facility: CLINIC | Age: 28
End: 2023-09-20
Payer: MEDICAID

## 2023-09-20 ENCOUNTER — APPOINTMENT (OUTPATIENT)
Dept: OBGYN | Facility: CLINIC | Age: 28
End: 2023-09-20
Payer: MEDICAID

## 2023-09-20 ENCOUNTER — ASOB RESULT (OUTPATIENT)
Age: 28
End: 2023-09-20

## 2023-09-20 VITALS
DIASTOLIC BLOOD PRESSURE: 68 MMHG | HEIGHT: 60 IN | BODY MASS INDEX: 31.8 KG/M2 | SYSTOLIC BLOOD PRESSURE: 116 MMHG | WEIGHT: 162 LBS

## 2023-09-20 DIAGNOSIS — Z34.92 ENCOUNTER FOR SUPERVISION OF NORMAL PREGNANCY, UNSPECIFIED, SECOND TRIMESTER: ICD-10-CM

## 2023-09-20 PROCEDURE — 76817 TRANSVAGINAL US OBSTETRIC: CPT

## 2023-09-20 PROCEDURE — 81003 URINALYSIS AUTO W/O SCOPE: CPT | Mod: QW

## 2023-09-20 PROCEDURE — 99213 OFFICE O/P EST LOW 20 MIN: CPT | Mod: TH,25

## 2023-09-22 LAB
GLUCOSE 1H P 50 G GLC PO SERPL-MCNC: 124 MG/DL
HCT VFR BLD CALC: 32.4 %
HGB BLD-MCNC: 10.8 G/DL
MCHC RBC-ENTMCNC: 32.9 PG
MCHC RBC-ENTMCNC: 33.3 GM/DL
MCV RBC AUTO: 98.8 FL
PLATELET # BLD AUTO: 244 K/UL
RBC # BLD: 3.28 M/UL
RBC # FLD: 12.5 %
WBC # FLD AUTO: 13.75 K/UL

## 2023-10-04 ENCOUNTER — APPOINTMENT (OUTPATIENT)
Dept: ANTEPARTUM | Facility: CLINIC | Age: 28
End: 2023-10-04
Payer: MEDICAID

## 2023-10-04 ENCOUNTER — ASOB RESULT (OUTPATIENT)
Age: 28
End: 2023-10-04

## 2023-10-04 PROCEDURE — 76817 TRANSVAGINAL US OBSTETRIC: CPT

## 2023-10-11 ENCOUNTER — NON-APPOINTMENT (OUTPATIENT)
Age: 28
End: 2023-10-11

## 2023-10-11 ENCOUNTER — APPOINTMENT (OUTPATIENT)
Dept: OBGYN | Facility: CLINIC | Age: 28
End: 2023-10-11
Payer: MEDICAID

## 2023-10-11 VITALS
WEIGHT: 164 LBS | DIASTOLIC BLOOD PRESSURE: 58 MMHG | SYSTOLIC BLOOD PRESSURE: 108 MMHG | HEIGHT: 60 IN | BODY MASS INDEX: 32.2 KG/M2

## 2023-10-11 DIAGNOSIS — O99.013 ANEMIA COMPLICATING PREGNANCY, THIRD TRIMESTER: ICD-10-CM

## 2023-10-11 PROCEDURE — 99213 OFFICE O/P EST LOW 20 MIN: CPT | Mod: TH

## 2023-10-11 RX ORDER — DOCUSATE SODIUM 100 MG/1
100 CAPSULE ORAL TWICE DAILY
Qty: 60 | Refills: 2 | Status: ACTIVE | COMMUNITY
Start: 2023-10-11 | End: 1900-01-01

## 2023-10-12 LAB
APPEARANCE: CLEAR
BILIRUBIN URINE: NEGATIVE
BLOOD URINE: NEGATIVE
COLOR: YELLOW
GLUCOSE QUALITATIVE U: NEGATIVE
KETONES URINE: NEGATIVE
LEUKOCYTE ESTERASE URINE: NEGATIVE
NITRITE URINE: NEGATIVE
PH URINE: 7
PROTEIN URINE: NEGATIVE
SPECIFIC GRAVITY URINE: 1.01
UROBILINOGEN URINE: 0.2

## 2023-10-23 ENCOUNTER — NON-APPOINTMENT (OUTPATIENT)
Age: 28
End: 2023-10-23

## 2023-10-25 ENCOUNTER — APPOINTMENT (OUTPATIENT)
Dept: OBGYN | Facility: CLINIC | Age: 28
End: 2023-10-25
Payer: MEDICAID

## 2023-10-25 VITALS
HEIGHT: 60 IN | BODY MASS INDEX: 32.63 KG/M2 | WEIGHT: 166.2 LBS | SYSTOLIC BLOOD PRESSURE: 102 MMHG | DIASTOLIC BLOOD PRESSURE: 60 MMHG

## 2023-10-25 DIAGNOSIS — Z23 ENCOUNTER FOR IMMUNIZATION: ICD-10-CM

## 2023-10-25 DIAGNOSIS — N76.0 ACUTE VAGINITIS: ICD-10-CM

## 2023-10-25 PROCEDURE — 90686 IIV4 VACC NO PRSV 0.5 ML IM: CPT

## 2023-10-25 PROCEDURE — 99213 OFFICE O/P EST LOW 20 MIN: CPT | Mod: TH,25

## 2023-10-25 PROCEDURE — ZZZZZ: CPT

## 2023-10-25 PROCEDURE — G0008: CPT

## 2023-10-25 RX ORDER — FLUCONAZOLE 150 MG/1
150 TABLET ORAL
Qty: 2 | Refills: 0 | Status: ACTIVE | COMMUNITY
Start: 2023-10-25 | End: 1900-01-01

## 2023-10-26 LAB
APPEARANCE: CLEAR
BILIRUBIN URINE: NEGATIVE
BLOOD URINE: NEGATIVE
COLOR: YELLOW
GLUCOSE QUALITATIVE U: NEGATIVE
KETONES URINE: NEGATIVE
LEUKOCYTE ESTERASE URINE: NEGATIVE
NITRITE URINE: NEGATIVE
PH URINE: 7
PROTEIN URINE: NEGATIVE
SPECIFIC GRAVITY URINE: <=1.005
UROBILINOGEN URINE: 0.2

## 2023-10-30 LAB
CANDIDA VAG CYTO: NOT DETECTED
G VAGINALIS+PREV SP MTYP VAG QL MICRO: NOT DETECTED
T VAGINALIS VAG QL WET PREP: NOT DETECTED

## 2023-11-08 ENCOUNTER — ASOB RESULT (OUTPATIENT)
Age: 28
End: 2023-11-08

## 2023-11-08 ENCOUNTER — APPOINTMENT (OUTPATIENT)
Dept: ANTEPARTUM | Facility: CLINIC | Age: 28
End: 2023-11-08
Payer: MEDICAID

## 2023-11-08 PROCEDURE — 76816 OB US FOLLOW-UP PER FETUS: CPT

## 2023-11-09 ENCOUNTER — APPOINTMENT (OUTPATIENT)
Dept: OBGYN | Facility: CLINIC | Age: 28
End: 2023-11-09
Payer: MEDICAID

## 2023-11-09 VITALS
HEIGHT: 60 IN | WEIGHT: 168.5 LBS | BODY MASS INDEX: 33.08 KG/M2 | DIASTOLIC BLOOD PRESSURE: 68 MMHG | SYSTOLIC BLOOD PRESSURE: 110 MMHG

## 2023-11-09 PROCEDURE — 90715 TDAP VACCINE 7 YRS/> IM: CPT

## 2023-11-09 PROCEDURE — 90471 IMMUNIZATION ADMIN: CPT

## 2023-11-09 PROCEDURE — 99213 OFFICE O/P EST LOW 20 MIN: CPT | Mod: TH,25

## 2023-11-10 ENCOUNTER — MED ADMIN CHARGE (OUTPATIENT)
Age: 28
End: 2023-11-10

## 2023-11-22 ENCOUNTER — NON-APPOINTMENT (OUTPATIENT)
Age: 28
End: 2023-11-22

## 2023-11-22 ENCOUNTER — APPOINTMENT (OUTPATIENT)
Dept: OBGYN | Facility: CLINIC | Age: 28
End: 2023-11-22
Payer: MEDICAID

## 2023-11-22 VITALS
WEIGHT: 172 LBS | BODY MASS INDEX: 33.77 KG/M2 | HEIGHT: 60 IN | SYSTOLIC BLOOD PRESSURE: 110 MMHG | DIASTOLIC BLOOD PRESSURE: 60 MMHG

## 2023-11-22 LAB
APPEARANCE: CLEAR
BILIRUBIN URINE: NEGATIVE
BLOOD URINE: NEGATIVE
COLOR: YELLOW
GLUCOSE QUALITATIVE U: NEGATIVE
KETONES URINE: NEGATIVE
LEUKOCYTE ESTERASE URINE: NEGATIVE
NITRITE URINE: NEGATIVE
PH URINE: 7
PROTEIN URINE: NEGATIVE
SPECIFIC GRAVITY URINE: 1.01
UROBILINOGEN URINE: 0.2 (ref 0.2–?)

## 2023-11-22 PROCEDURE — 99213 OFFICE O/P EST LOW 20 MIN: CPT | Mod: TH,25

## 2023-11-28 LAB
APPEARANCE: CLEAR
BILIRUBIN URINE: NEGATIVE
BLOOD URINE: NEGATIVE
COLOR: YELLOW
GLUCOSE BLDC GLUCOMTR-MCNC: 134
GLUCOSE QUALITATIVE U: 100
KETONES URINE: ABNORMAL
LEUKOCYTE ESTERASE URINE: NEGATIVE
NITRITE URINE: NEGATIVE
PH URINE: 7
PROTEIN URINE: NEGATIVE
SPECIFIC GRAVITY URINE: 1.01
UROBILINOGEN URINE: 0.2

## 2023-12-03 NOTE — OB RN PREOPERATIVE CHECKLIST - IV STARTED
yes
Migraine Headache  A migraine headache is an intense, throbbing pain on one side or both sides of the head. Migraine headaches may also cause other symptoms, such as nausea, vomiting, and sensitivity to light and noise. A migraine headache can last from 4 hours to 3 days. Talk with your doctor about what things may bring on (trigger) your migraine headaches.    What are the causes?  The exact cause of this condition is not known. However, a migraine may be caused when nerves in the brain become irritated and release chemicals that cause inflammation of blood vessels. This inflammation causes pain. This condition may be triggered or caused by:  Drinking alcohol.  Smoking.  Taking medicines, such as:  Medicine used to treat chest pain (nitroglycerin).  Birth control pills.  Estrogen.  Certain blood pressure medicines.  Eating or drinking products that contain nitrates, glutamate, aspartame, or tyramine. Aged cheeses, chocolate, or caffeine may also be triggers.  Doing physical activity.  Other things that may trigger a migraine headache include:  Menstruation.  Pregnancy.  Hunger.  Stress.  Lack of sleep or too much sleep.  Weather changes.  Fatigue.  What increases the risk?  The following factors may make you more likely to experience migraine headaches:  Being a certain age. This condition is more common in people who are 25–55 years old.  Being female.  Having a family history of migraine headaches.  Being .  Having a mental health condition, such as depression or anxiety.  Being obese.  What are the signs or symptoms?  The main symptom of this condition is pulsating or throbbing pain. This pain may:  Happen in any area of the head, such as on one side or both sides.  Interfere with daily activities.  Get worse with physical activity.  Get worse with exposure to bright lights or loud noises.  Other symptoms may include:  Nausea.  Vomiting.  Dizziness.  General sensitivity to bright lights, loud noises, or smells.  Before you get a migraine headache, you may get warning signs (an aura). An aura may include:  Seeing flashing lights or having blind spots.  Seeing bright spots, halos, or zigzag lines.  Having tunnel vision or blurred vision.  Having numbness or a tingling feeling.  Having trouble talking.  Having muscle weakness.  Some people have symptoms after a migraine headache (postdromal phase), such as:  Feeling tired.  Difficulty concentrating.  How is this diagnosed?  A migraine headache can be diagnosed based on:  Your symptoms.  A physical exam.  Tests, such as:  CT scan or an MRI of the head. These imaging tests can help rule out other causes of headaches.  Taking fluid from the spine (lumbar puncture) and analyzing it (cerebrospinal fluid analysis, or CSF analysis).  How is this treated?  This condition may be treated with medicines that:  Relieve pain.  Relieve nausea.  Prevent migraine headaches.  Treatment for this condition may also include:  Acupuncture.  Lifestyle changes like avoiding foods that trigger migraine headaches.  Biofeedback.  Cognitive behavioral therapy.  Follow these instructions at home:  Medicines    Take over-the-counter and prescription medicines only as told by your health care provider.  Ask your health care provider if the medicine prescribed to you:  Requires you to avoid driving or using heavy machinery.  Can cause constipation. You may need to take these actions to prevent or treat constipation:  Drink enough fluid to keep your urine pale yellow.  Take over-the-counter or prescription medicines.  Eat foods that are high in fiber, such as beans, whole grains, and fresh fruits and vegetables.  Limit foods that are high in fat and processed sugars, such as fried or sweet foods.  Lifestyle    Do not drink alcohol.  Do not use any products that contain nicotine or tobacco, such as cigarettes, e-cigarettes, and chewing tobacco. If you need help quitting, ask your health care provider.  Get at least 8 hours of sleep every night.  Find ways to manage stress, such as meditation, deep breathing, or yoga.  General instructions    Keep a journal to find out what may trigger your migraine headaches. For example, write down:  What you eat and drink.  How much sleep you get.  Any change to your diet or medicines.  If you have a migraine headache:  Avoid things that make your symptoms worse, such as bright lights.  It may help to lie down in a dark, quiet room.  Do not drive or use heavy machinery.  Ask your health care provider what activities are safe for you while you are experiencing symptoms.  Keep all follow-up visits as told by your health care provider. This is important.  Contact a health care provider if:  You develop symptoms that are different or more severe than your usual migraine headache symptoms.  You have more than 15 headache days in one month.  Get help right away if:  Your migraine headache becomes severe.  Your migraine headache lasts longer than 72 hours.  You have a fever.  You have a stiff neck.  You have vision loss.  Your muscles feel weak or like you cannot control them.  You start to lose your balance often.  You have trouble walking.  You faint.  You have a seizure.  Summary  A migraine headache is an intense, throbbing pain on one side or both sides of the head. Migraines may also cause other symptoms, such as nausea, vomiting, and sensitivity to light and noise.  This condition may be treated with medicines and lifestyle changes. You may also need to avoid certain things that trigger a migraine headache.  Keep a journal to find out what may trigger your migraine headaches.  Contact your health care provider if you have more than 15 headache days in a month or you develop symptoms that are different or more severe than your usual migraine headache symptoms.  This information is not intended to replace advice given to you by your health care provider. Make sure you discuss any questions you have with your health care provider.

## 2023-12-06 ENCOUNTER — APPOINTMENT (OUTPATIENT)
Dept: OBGYN | Facility: CLINIC | Age: 28
End: 2023-12-06
Payer: MEDICAID

## 2023-12-06 ENCOUNTER — NON-APPOINTMENT (OUTPATIENT)
Age: 28
End: 2023-12-06

## 2023-12-06 VITALS
BODY MASS INDEX: 34.36 KG/M2 | WEIGHT: 175 LBS | DIASTOLIC BLOOD PRESSURE: 62 MMHG | SYSTOLIC BLOOD PRESSURE: 100 MMHG | HEIGHT: 60 IN

## 2023-12-06 DIAGNOSIS — O34.30 MATERNAL CARE FOR CERVICAL INCOMPETENCE, UNSPECIFIED TRIMESTER: ICD-10-CM

## 2023-12-06 PROCEDURE — 59871 REMOVE CERCLAGE SUTURE: CPT

## 2023-12-06 PROCEDURE — 99213 OFFICE O/P EST LOW 20 MIN: CPT | Mod: TH,25

## 2023-12-07 ENCOUNTER — ASOB RESULT (OUTPATIENT)
Age: 28
End: 2023-12-07

## 2023-12-07 ENCOUNTER — APPOINTMENT (OUTPATIENT)
Dept: ANTEPARTUM | Facility: CLINIC | Age: 28
End: 2023-12-07
Payer: MEDICAID

## 2023-12-07 PROBLEM — O34.30 MCDONALD CERCLAGE PRESENT: Status: ACTIVE | Noted: 2023-08-22

## 2023-12-07 PROCEDURE — 76819 FETAL BIOPHYS PROFIL W/O NST: CPT | Mod: 59

## 2023-12-07 PROCEDURE — 76816 OB US FOLLOW-UP PER FETUS: CPT

## 2023-12-14 ENCOUNTER — NON-APPOINTMENT (OUTPATIENT)
Age: 28
End: 2023-12-14

## 2023-12-14 ENCOUNTER — APPOINTMENT (OUTPATIENT)
Dept: OBGYN | Facility: CLINIC | Age: 28
End: 2023-12-14
Payer: MEDICAID

## 2023-12-14 VITALS
BODY MASS INDEX: 34.47 KG/M2 | WEIGHT: 175.6 LBS | SYSTOLIC BLOOD PRESSURE: 112 MMHG | HEIGHT: 60 IN | DIASTOLIC BLOOD PRESSURE: 64 MMHG

## 2023-12-14 DIAGNOSIS — Z34.93 ENCOUNTER FOR SUPERVISION OF NORMAL PREGNANCY, UNSPECIFIED, THIRD TRIMESTER: ICD-10-CM

## 2023-12-14 LAB
APPEARANCE: CLEAR
BILIRUBIN URINE: NEGATIVE
BLOOD URINE: ABNORMAL
COLOR: YELLOW
GLUCOSE QUALITATIVE U: NEGATIVE
KETONES URINE: ABNORMAL
LEUKOCYTE ESTERASE URINE: NEGATIVE
NITRITE URINE: NEGATIVE
PH URINE: 6.5
PROTEIN URINE: ABNORMAL
SPECIFIC GRAVITY URINE: 1.01
UROBILINOGEN URINE: 0.2 (ref 0.2–?)

## 2023-12-14 PROCEDURE — 99213 OFFICE O/P EST LOW 20 MIN: CPT | Mod: TH

## 2023-12-15 LAB
APPEARANCE: CLEAR
B-HEM STREP SPEC QL CULT: NORMAL
BASOPHILS # BLD AUTO: 0.02 K/UL
BASOPHILS NFR BLD AUTO: 0.2 %
BILIRUBIN URINE: NEGATIVE
BLOOD URINE: ABNORMAL
COLOR: YELLOW
EOSINOPHIL # BLD AUTO: 0.14 K/UL
EOSINOPHIL NFR BLD AUTO: 1.2 %
FERRITIN SERPL-MCNC: 38 NG/ML
FOLATE SERPL-MCNC: 19.2 NG/ML
GLUCOSE QUALITATIVE U: NEGATIVE
HCT VFR BLD CALC: 35.6 %
HGB BLD-MCNC: 11.4 G/DL
HIV1+2 AB SPEC QL IA.RAPID: NONREACTIVE
IMM GRANULOCYTES NFR BLD AUTO: 0.4 %
IRON SATN MFR SERPL: 19 %
IRON SERPL-MCNC: 77 UG/DL
KETONES URINE: 15
LEUKOCYTE ESTERASE URINE: NEGATIVE
LYMPHOCYTES # BLD AUTO: 2.12 K/UL
LYMPHOCYTES NFR BLD AUTO: 18.8 %
MAN DIFF?: NORMAL
MCHC RBC-ENTMCNC: 31.8 PG
MCHC RBC-ENTMCNC: 32 GM/DL
MCV RBC AUTO: 99.4 FL
MONOCYTES # BLD AUTO: 0.81 K/UL
MONOCYTES NFR BLD AUTO: 7.2 %
NEUTROPHILS # BLD AUTO: 8.13 K/UL
NEUTROPHILS NFR BLD AUTO: 72.2 %
NITRITE URINE: NEGATIVE
PH URINE: 7
PLATELET # BLD AUTO: 273 K/UL
PROTEIN URINE: 30
RBC # BLD: 3.58 M/UL
RBC # BLD: 3.58 M/UL
RBC # FLD: 12.9 %
RETICS # AUTO: 3.3 %
RETICS AGGREG/RBC NFR: 118.5 K/UL
SPECIFIC GRAVITY URINE: 1.02
T PALLIDUM AB SER QL IA: NEGATIVE
TIBC SERPL-MCNC: 399 UG/DL
TRANSFERRIN SERPL-MCNC: 313 MG/DL
UIBC SERPL-MCNC: 321 UG/DL
UROBILINOGEN URINE: 0.2 (ref 0.2–?)
VIT B12 SERPL-MCNC: 296 PG/ML
WBC # FLD AUTO: 11.27 K/UL

## 2023-12-21 ENCOUNTER — INPATIENT (INPATIENT)
Facility: HOSPITAL | Age: 28
LOS: 1 days | Discharge: HOME | End: 2023-12-23
Attending: STUDENT IN AN ORGANIZED HEALTH CARE EDUCATION/TRAINING PROGRAM | Admitting: STUDENT IN AN ORGANIZED HEALTH CARE EDUCATION/TRAINING PROGRAM
Payer: COMMERCIAL

## 2023-12-21 ENCOUNTER — APPOINTMENT (OUTPATIENT)
Dept: OBGYN | Facility: CLINIC | Age: 28
End: 2023-12-21

## 2023-12-21 VITALS
SYSTOLIC BLOOD PRESSURE: 121 MMHG | DIASTOLIC BLOOD PRESSURE: 74 MMHG | HEART RATE: 88 BPM | TEMPERATURE: 98 F | RESPIRATION RATE: 16 BRPM

## 2023-12-21 DIAGNOSIS — Z3A.38 38 WEEKS GESTATION OF PREGNANCY: ICD-10-CM

## 2023-12-21 DIAGNOSIS — O26.893 OTHER SPECIFIED PREGNANCY RELATED CONDITIONS, THIRD TRIMESTER: ICD-10-CM

## 2023-12-21 DIAGNOSIS — O26.899 OTHER SPECIFIED PREGNANCY RELATED CONDITIONS, UNSPECIFIED TRIMESTER: ICD-10-CM

## 2023-12-21 LAB
BASOPHILS # BLD AUTO: 0.02 K/UL — SIGNIFICANT CHANGE UP (ref 0–0.2)
BASOPHILS # BLD AUTO: 0.02 K/UL — SIGNIFICANT CHANGE UP (ref 0–0.2)
BASOPHILS NFR BLD AUTO: 0.2 % — SIGNIFICANT CHANGE UP (ref 0–2)
BASOPHILS NFR BLD AUTO: 0.2 % — SIGNIFICANT CHANGE UP (ref 0–2)
BLD GP AB SCN SERPL QL: SIGNIFICANT CHANGE UP
BLD GP AB SCN SERPL QL: SIGNIFICANT CHANGE UP
EOSINOPHIL # BLD AUTO: 0.06 K/UL — SIGNIFICANT CHANGE UP (ref 0–0.5)
EOSINOPHIL # BLD AUTO: 0.06 K/UL — SIGNIFICANT CHANGE UP (ref 0–0.5)
EOSINOPHIL NFR BLD AUTO: 0.6 % — SIGNIFICANT CHANGE UP (ref 0–6)
EOSINOPHIL NFR BLD AUTO: 0.6 % — SIGNIFICANT CHANGE UP (ref 0–6)
HCT VFR BLD CALC: 36.8 % — SIGNIFICANT CHANGE UP (ref 34.5–45)
HCT VFR BLD CALC: 36.8 % — SIGNIFICANT CHANGE UP (ref 34.5–45)
HGB BLD-MCNC: 12.9 G/DL — SIGNIFICANT CHANGE UP (ref 11.5–15.5)
HGB BLD-MCNC: 12.9 G/DL — SIGNIFICANT CHANGE UP (ref 11.5–15.5)
IMM GRANULOCYTES NFR BLD AUTO: 0.3 % — SIGNIFICANT CHANGE UP (ref 0–0.9)
IMM GRANULOCYTES NFR BLD AUTO: 0.3 % — SIGNIFICANT CHANGE UP (ref 0–0.9)
LYMPHOCYTES # BLD AUTO: 1.78 K/UL — SIGNIFICANT CHANGE UP (ref 1–3.3)
LYMPHOCYTES # BLD AUTO: 1.78 K/UL — SIGNIFICANT CHANGE UP (ref 1–3.3)
LYMPHOCYTES # BLD AUTO: 18.3 % — SIGNIFICANT CHANGE UP (ref 13–44)
LYMPHOCYTES # BLD AUTO: 18.3 % — SIGNIFICANT CHANGE UP (ref 13–44)
MCHC RBC-ENTMCNC: 33.3 PG — SIGNIFICANT CHANGE UP (ref 27–34)
MCHC RBC-ENTMCNC: 33.3 PG — SIGNIFICANT CHANGE UP (ref 27–34)
MCHC RBC-ENTMCNC: 35.1 GM/DL — SIGNIFICANT CHANGE UP (ref 32–36)
MCHC RBC-ENTMCNC: 35.1 GM/DL — SIGNIFICANT CHANGE UP (ref 32–36)
MCV RBC AUTO: 95.1 FL — SIGNIFICANT CHANGE UP (ref 80–100)
MCV RBC AUTO: 95.1 FL — SIGNIFICANT CHANGE UP (ref 80–100)
MONOCYTES # BLD AUTO: 0.5 K/UL — SIGNIFICANT CHANGE UP (ref 0–0.9)
MONOCYTES # BLD AUTO: 0.5 K/UL — SIGNIFICANT CHANGE UP (ref 0–0.9)
MONOCYTES NFR BLD AUTO: 5.1 % — SIGNIFICANT CHANGE UP (ref 2–14)
MONOCYTES NFR BLD AUTO: 5.1 % — SIGNIFICANT CHANGE UP (ref 2–14)
NEUTROPHILS # BLD AUTO: 7.35 K/UL — SIGNIFICANT CHANGE UP (ref 1.8–7.4)
NEUTROPHILS # BLD AUTO: 7.35 K/UL — SIGNIFICANT CHANGE UP (ref 1.8–7.4)
NEUTROPHILS NFR BLD AUTO: 75.5 % — SIGNIFICANT CHANGE UP (ref 43–77)
NEUTROPHILS NFR BLD AUTO: 75.5 % — SIGNIFICANT CHANGE UP (ref 43–77)
PLATELET # BLD AUTO: 303 K/UL — SIGNIFICANT CHANGE UP (ref 150–400)
PLATELET # BLD AUTO: 303 K/UL — SIGNIFICANT CHANGE UP (ref 150–400)
RBC # BLD: 3.87 M/UL — SIGNIFICANT CHANGE UP (ref 3.8–5.2)
RBC # BLD: 3.87 M/UL — SIGNIFICANT CHANGE UP (ref 3.8–5.2)
RBC # FLD: 12.5 % — SIGNIFICANT CHANGE UP (ref 10.3–14.5)
RBC # FLD: 12.5 % — SIGNIFICANT CHANGE UP (ref 10.3–14.5)
WBC # BLD: 9.74 K/UL — SIGNIFICANT CHANGE UP (ref 3.8–10.5)
WBC # BLD: 9.74 K/UL — SIGNIFICANT CHANGE UP (ref 3.8–10.5)
WBC # FLD AUTO: 9.74 K/UL — SIGNIFICANT CHANGE UP (ref 3.8–10.5)
WBC # FLD AUTO: 9.74 K/UL — SIGNIFICANT CHANGE UP (ref 3.8–10.5)

## 2023-12-21 RX ORDER — CITRIC ACID/SODIUM CITRATE 300-500 MG
30 SOLUTION, ORAL ORAL ONCE
Refills: 0 | Status: DISCONTINUED | OUTPATIENT
Start: 2023-12-21 | End: 2023-12-22

## 2023-12-21 RX ORDER — OXYTOCIN 10 UNIT/ML
VIAL (ML) INJECTION
Qty: 30 | Refills: 0 | Status: DISCONTINUED | OUTPATIENT
Start: 2023-12-21 | End: 2023-12-22

## 2023-12-21 RX ORDER — OXYTOCIN 10 UNIT/ML
333.33 VIAL (ML) INJECTION
Qty: 20 | Refills: 0 | Status: COMPLETED | OUTPATIENT
Start: 2023-12-21 | End: 2023-12-21

## 2023-12-21 RX ORDER — CHLORHEXIDINE GLUCONATE 213 G/1000ML
1 SOLUTION TOPICAL DAILY
Refills: 0 | Status: DISCONTINUED | OUTPATIENT
Start: 2023-12-21 | End: 2023-12-22

## 2023-12-21 RX ORDER — SODIUM CHLORIDE 9 MG/ML
1000 INJECTION, SOLUTION INTRAVENOUS
Refills: 0 | Status: DISCONTINUED | OUTPATIENT
Start: 2023-12-21 | End: 2023-12-22

## 2023-12-21 RX ADMIN — Medication 2 MILLIUNIT(S)/MIN: at 19:00

## 2023-12-21 RX ADMIN — SODIUM CHLORIDE 125 MILLILITER(S): 9 INJECTION, SOLUTION INTRAVENOUS at 14:21

## 2023-12-21 RX ADMIN — SODIUM CHLORIDE 125 MILLILITER(S): 9 INJECTION, SOLUTION INTRAVENOUS at 21:33

## 2023-12-21 NOTE — OB PROVIDER H&P - NSHPPHYSICALEXAM_GEN_ALL_CORE
Vital Signs Last 24 Hrs  T(C): 36.7 (21 Dec 2023 13:16), Max: 36.7 (21 Dec 2023 13:16)  T(F): 98.06 (21 Dec 2023 13:16), Max: 98.1 (21 Dec 2023 13:16)  HR: 81 (21 Dec 2023 13:56) (81 - 88)  BP: 121/74 (21 Dec 2023 13:16) (121/74 - 121/74)  BP(mean): --  RR: 16 (21 Dec 2023 13:16) (16 - 16)      Parameters below as of 21 Dec 2023 13:16  Patient On (Oxygen Delivery Method): room air    Gen: NAD  Abd: gravid, non-tender  SSE: gross pooling of clear water  SVE: 3/80/-2  FHT: 125 bpm, moderate variability, + accels, - decels   [place]: q6-9 min  Bedside US: vertex, posterior placenta Vital Signs Last 24 Hrs  T(C): 36.7 (21 Dec 2023 13:16), Max: 36.7 (21 Dec 2023 13:16)  T(F): 98.06 (21 Dec 2023 13:16), Max: 98.1 (21 Dec 2023 13:16)  HR: 81 (21 Dec 2023 13:56) (81 - 88)  BP: 121/74 (21 Dec 2023 13:16) (121/74 - 121/74)  BP(mean): --  RR: 16 (21 Dec 2023 13:16) (16 - 16)      Parameters below as of 21 Dec 2023 13:16  Patient On (Oxygen Delivery Method): room air    Gen: NAD  Abd: gravid, non-tender  SSE: gross pooling of clear water  SVE: 3/80/-2  FHT: 125 bpm, moderate variability, + accels, - decels   [place]: q6-9 min  Bedside US: vertex, posterior placenta

## 2023-12-21 NOTE — OB RN TRIAGE NOTE - FALL HARM RISK - UNIVERSAL INTERVENTIONS
Bed in lowest position, wheels locked, appropriate side rails in place/Call bell, personal items and telephone in reach/Instruct patient to call for assistance before getting out of bed or chair/Non-slip footwear when patient is out of bed/[place] to call system/Physically safe environment - no spills, clutter or unnecessary equipment/Purposeful Proactive Rounding/Room/bathroom lighting operational, light cord in reach Bed in lowest position, wheels locked, appropriate side rails in place/Call bell, personal items and telephone in reach/Instruct patient to call for assistance before getting out of bed or chair/Non-slip footwear when patient is out of bed/[place] to call system/Physically safe environment - no spills, clutter or unnecessary equipment/Purposeful Proactive Rounding/Room/bathroom lighting operational, light cord in reach

## 2023-12-21 NOTE — OB PROVIDER H&P - NSLOWPPHRISK_OBGYN_A_OB
No previous uterine incision/Haq Pregnancy/Less than or equal to 4 previous vaginal births/No known bleeding disorder/No history of postpartum hemorrhage

## 2023-12-21 NOTE — OB PROVIDER H&P - HISTORY OF PRESENT ILLNESS
Patient is a 28 y.o.  at 38 weeks 6 days gestation who presents to L&D with concern for leakage of fluid.   She reports around 1030 she got up and had a gush of clear water with white specks in it. She reports having contractions before the episode and after. She denies vaginal bleeding, reports good fetal movement.     This pregnancy is complicated by:  - history indicated cerclage  - anemia    OBHx:  - SAB   -  birth @ 18 weeks, 21  PMH: denies  PSH: denies  Meds: PNV, (iron per EMR)  All: NKDA  SocialHx: denies alcohol, tobacco, and drug use. Feels safe at home.

## 2023-12-21 NOTE — OB RN TRIAGE NOTE - SOCIAL CONCERNS
Pt presents with vomiting and diarrhea starting Sunday night, fever starting today tmax 101.6. Pt endorses RLQ abdominal pain. Abdomen soft and non tender. Tolerating PO. Denies PMH, NKA, lactose intolerant, IUTD. None

## 2023-12-21 NOTE — OB PROVIDER H&P - ASSESSMENT
Patient is a 28 y.o.  at 38 weeks 6 days gestation admitted to L&D for labor. Cat 1 FHT.    - consent  - admission labs  - IV hydration  - continuous toco and fetal heart monitoring  - GBS negative, no ppx required  - augmentation with pitocin prn  - pain management: epidural prn    Discussed with Dr. Foster     Patient is a 28 y.o.  at 38 weeks 6 days gestation admitted to L&D for labor. Cat 1 FHT.    - consent  - admission labs  - IV hydration  - continuous toco and fetal heart monitoring  - GBS negative, no ppx required  - augmentation with pitocin prn  - pain management: epidural prn    Discussed with Dr. Strong

## 2023-12-21 NOTE — OB PROVIDER H&P - NSOBVTERISKREFER_OBGYN_ALL_OB
Patient informed/Explanation of wait
Refer to the Assessment tab to view/cancel completed assessment.

## 2023-12-21 NOTE — OB PROVIDER H&P - ATTENDING COMMENTS
28 y.o.  at 38 weeks 6 days gestation who presents to L&D after SROM, consent for care signed after questions answered. Patient wants to be warned before an episiotomy is utilized.

## 2023-12-22 VITALS
SYSTOLIC BLOOD PRESSURE: 103 MMHG | RESPIRATION RATE: 17 BRPM | DIASTOLIC BLOOD PRESSURE: 68 MMHG | HEART RATE: 96 BPM | OXYGEN SATURATION: 99 % | TEMPERATURE: 98 F

## 2023-12-22 LAB
T PALLIDUM AB TITR SER: NEGATIVE — SIGNIFICANT CHANGE UP
T PALLIDUM AB TITR SER: NEGATIVE — SIGNIFICANT CHANGE UP

## 2023-12-22 PROCEDURE — 59409 OBSTETRICAL CARE: CPT | Mod: U9

## 2023-12-22 RX ORDER — KETOROLAC TROMETHAMINE 30 MG/ML
30 SYRINGE (ML) INJECTION ONCE
Refills: 0 | Status: DISCONTINUED | OUTPATIENT
Start: 2023-12-22 | End: 2023-12-22

## 2023-12-22 RX ORDER — IBUPROFEN 200 MG
600 TABLET ORAL EVERY 6 HOURS
Refills: 0 | Status: COMPLETED | OUTPATIENT
Start: 2023-12-22 | End: 2024-11-19

## 2023-12-22 RX ORDER — TETANUS TOXOID, REDUCED DIPHTHERIA TOXOID AND ACELLULAR PERTUSSIS VACCINE, ADSORBED 5; 2.5; 8; 8; 2.5 [IU]/.5ML; [IU]/.5ML; UG/.5ML; UG/.5ML; UG/.5ML
0.5 SUSPENSION INTRAMUSCULAR ONCE
Refills: 0 | Status: DISCONTINUED | OUTPATIENT
Start: 2023-12-22 | End: 2023-12-23

## 2023-12-22 RX ORDER — SIMETHICONE 80 MG/1
80 TABLET, CHEWABLE ORAL EVERY 4 HOURS
Refills: 0 | Status: DISCONTINUED | OUTPATIENT
Start: 2023-12-22 | End: 2023-12-23

## 2023-12-22 RX ORDER — OXYTOCIN 10 UNIT/ML
41.67 VIAL (ML) INJECTION
Qty: 20 | Refills: 0 | Status: DISCONTINUED | OUTPATIENT
Start: 2023-12-22 | End: 2023-12-23

## 2023-12-22 RX ORDER — OXYCODONE HYDROCHLORIDE 5 MG/1
5 TABLET ORAL
Refills: 0 | Status: DISCONTINUED | OUTPATIENT
Start: 2023-12-22 | End: 2023-12-23

## 2023-12-22 RX ORDER — PRAMOXINE HYDROCHLORIDE 150 MG/15G
1 AEROSOL, FOAM RECTAL EVERY 4 HOURS
Refills: 0 | Status: DISCONTINUED | OUTPATIENT
Start: 2023-12-22 | End: 2023-12-23

## 2023-12-22 RX ORDER — ACETAMINOPHEN 500 MG
975 TABLET ORAL
Refills: 0 | Status: DISCONTINUED | OUTPATIENT
Start: 2023-12-22 | End: 2023-12-23

## 2023-12-22 RX ORDER — HYDROCORTISONE 1 %
1 OINTMENT (GRAM) TOPICAL EVERY 6 HOURS
Refills: 0 | Status: DISCONTINUED | OUTPATIENT
Start: 2023-12-22 | End: 2023-12-23

## 2023-12-22 RX ORDER — AER TRAVELER 0.5 G/1
1 SOLUTION RECTAL; TOPICAL EVERY 4 HOURS
Refills: 0 | Status: DISCONTINUED | OUTPATIENT
Start: 2023-12-22 | End: 2023-12-23

## 2023-12-22 RX ORDER — SODIUM CHLORIDE 9 MG/ML
3 INJECTION INTRAMUSCULAR; INTRAVENOUS; SUBCUTANEOUS EVERY 8 HOURS
Refills: 0 | Status: DISCONTINUED | OUTPATIENT
Start: 2023-12-22 | End: 2023-12-23

## 2023-12-22 RX ORDER — MAGNESIUM HYDROXIDE 400 MG/1
30 TABLET, CHEWABLE ORAL
Refills: 0 | Status: DISCONTINUED | OUTPATIENT
Start: 2023-12-22 | End: 2023-12-23

## 2023-12-22 RX ORDER — OXYCODONE HYDROCHLORIDE 5 MG/1
5 TABLET ORAL ONCE
Refills: 0 | Status: DISCONTINUED | OUTPATIENT
Start: 2023-12-22 | End: 2023-12-23

## 2023-12-22 RX ORDER — DIBUCAINE 1 %
1 OINTMENT (GRAM) RECTAL EVERY 6 HOURS
Refills: 0 | Status: DISCONTINUED | OUTPATIENT
Start: 2023-12-22 | End: 2023-12-23

## 2023-12-22 RX ORDER — IBUPROFEN 200 MG
600 TABLET ORAL EVERY 6 HOURS
Refills: 0 | Status: DISCONTINUED | OUTPATIENT
Start: 2023-12-22 | End: 2023-12-23

## 2023-12-22 RX ORDER — DIPHENHYDRAMINE HCL 50 MG
25 CAPSULE ORAL EVERY 6 HOURS
Refills: 0 | Status: DISCONTINUED | OUTPATIENT
Start: 2023-12-22 | End: 2023-12-23

## 2023-12-22 RX ORDER — BENZOCAINE 10 %
1 GEL (GRAM) MUCOUS MEMBRANE EVERY 6 HOURS
Refills: 0 | Status: DISCONTINUED | OUTPATIENT
Start: 2023-12-22 | End: 2023-12-23

## 2023-12-22 RX ORDER — LANOLIN
1 OINTMENT (GRAM) TOPICAL EVERY 6 HOURS
Refills: 0 | Status: DISCONTINUED | OUTPATIENT
Start: 2023-12-22 | End: 2023-12-23

## 2023-12-22 RX ADMIN — Medication 1000 MILLIUNIT(S)/MIN: at 04:11

## 2023-12-22 RX ADMIN — Medication 975 MILLIGRAM(S): at 22:54

## 2023-12-22 RX ADMIN — Medication 1 TABLET(S): at 14:26

## 2023-12-22 RX ADMIN — SODIUM CHLORIDE 3 MILLILITER(S): 9 INJECTION INTRAMUSCULAR; INTRAVENOUS; SUBCUTANEOUS at 22:54

## 2023-12-22 RX ADMIN — SODIUM CHLORIDE 125 MILLILITER(S): 9 INJECTION, SOLUTION INTRAVENOUS at 02:53

## 2023-12-22 RX ADMIN — Medication 975 MILLIGRAM(S): at 21:02

## 2023-12-22 RX ADMIN — Medication 30 MILLIGRAM(S): at 04:41

## 2023-12-22 RX ADMIN — Medication 600 MILLIGRAM(S): at 12:47

## 2023-12-22 RX ADMIN — Medication 600 MILLIGRAM(S): at 23:35

## 2023-12-22 RX ADMIN — Medication 30 MILLIGRAM(S): at 04:13

## 2023-12-22 RX ADMIN — Medication 975 MILLIGRAM(S): at 08:30

## 2023-12-22 RX ADMIN — Medication 600 MILLIGRAM(S): at 17:56

## 2023-12-22 RX ADMIN — SODIUM CHLORIDE 3 MILLILITER(S): 9 INJECTION INTRAMUSCULAR; INTRAVENOUS; SUBCUTANEOUS at 06:12

## 2023-12-22 NOTE — OB RN DELIVERY SUMMARY - NS_DELBLDTRANSFUS_OBGYN_ALL_OB
Detail Level: Detailed Detail Level: Simple Patient Specific Counseling (Will Not Stick From Patient To Patient): Rifght angle of jaw, unable to locate today due to previous unmarked photos. Patient Specific Counseling (Will Not Stick From Patient To Patient): Right chin, unable to locate today due to previous unmarked photos.  \\nTreatment is scheduled Patient Specific Counseling (Will Not Stick From Patient To Patient): Right posterior shoulder, unable to locate today due to previous unmarked photos.  \\nTreatment scheduled, at his MOHS appt Han Bhatia to confirm sites with patient earlene, and retake photos No

## 2023-12-22 NOTE — OB RN DELIVERY SUMMARY - NSSELHIDDEN_OBGYN_ALL_OB_FT
[NS_DeliveryAttending1_OBGYN_ALL_OB_FT:[id number]],[NS_DeliveryAssist1_OBGYN_ALL_OB_FT:[id number]=],[NS_DeliveryRN_OBGYN_ALL_OB_FT:[id number]] [NS_DeliveryAttending1_OBGYN_ALL_OB_FT:[id number]],[NS_DeliveryAssist1_OBGYN_ALL_OB_FT:[id number]=],[NS_DeliveryRN_OBGYN_ALL_OB_FT:[id number]=]

## 2023-12-22 NOTE — OB RN DELIVERY SUMMARY - NS_SEPSISRSKCALC_OBGYN_ALL_OB_FT
EOS calculated successfully. EOS Risk Factor: 0.5/1000 live births ([facility] national incidence); GA=39w;Temp=98.24; ROM=16.967; GBS='Negative'; Antibiotics='No antibiotics or any antibiotics < 2 hrs prior to birth'   EOS calculated successfully. EOS Risk Factor: 0.5/1000 live births ([facility] national incidence); GA=39w;Temp=98.24; ROM=16.967; GBS='Negative'; Antibiotics='No antibiotics or any antibiotics < 2 hrs prior to birth'

## 2023-12-22 NOTE — OB PROVIDER DELIVERY SUMMARY - NSSELHIDDEN_OBGYN_ALL_OB_FT
[NS_DeliveryAttending1_OBGYN_ALL_OB_FT:[id number]=],[NS_DeliveryAssist1_OBGYN_ALL_OB_FT:[id number]=] [NS_DeliveryAttending1_OBGYN_ALL_OB_FT:[id number]=],[NS_DeliveryAssist1_OBGYN_ALL_OB_FT:[id number]=]

## 2023-12-23 ENCOUNTER — TRANSCRIPTION ENCOUNTER (OUTPATIENT)
Age: 28
End: 2023-12-23

## 2023-12-23 LAB
HCT VFR BLD CALC: 31.1 % — LOW (ref 34.5–45)
HCT VFR BLD CALC: 31.1 % — LOW (ref 34.5–45)
HGB BLD-MCNC: 10.8 G/DL — LOW (ref 11.5–15.5)
HGB BLD-MCNC: 10.8 G/DL — LOW (ref 11.5–15.5)

## 2023-12-23 PROCEDURE — T1013: CPT

## 2023-12-23 PROCEDURE — 85025 COMPLETE CBC W/AUTO DIFF WBC: CPT

## 2023-12-23 PROCEDURE — 86780 TREPONEMA PALLIDUM: CPT

## 2023-12-23 PROCEDURE — 86901 BLOOD TYPING SEROLOGIC RH(D): CPT

## 2023-12-23 PROCEDURE — 85018 HEMOGLOBIN: CPT

## 2023-12-23 PROCEDURE — 86900 BLOOD TYPING SEROLOGIC ABO: CPT

## 2023-12-23 PROCEDURE — 85014 HEMATOCRIT: CPT

## 2023-12-23 PROCEDURE — 86850 RBC ANTIBODY SCREEN: CPT

## 2023-12-23 PROCEDURE — 36415 COLL VENOUS BLD VENIPUNCTURE: CPT

## 2023-12-23 RX ORDER — IBUPROFEN 200 MG
1 TABLET ORAL
Qty: 0 | Refills: 0 | DISCHARGE
Start: 2023-12-23

## 2023-12-23 RX ORDER — ACETAMINOPHEN 500 MG
3 TABLET ORAL
Qty: 30 | Refills: 0
Start: 2023-12-23

## 2023-12-23 RX ORDER — IBUPROFEN 200 MG
1 TABLET ORAL
Qty: 30 | Refills: 0
Start: 2023-12-23

## 2023-12-23 RX ORDER — ACETAMINOPHEN 500 MG
3 TABLET ORAL
Qty: 0 | Refills: 0 | DISCHARGE
Start: 2023-12-23

## 2023-12-23 RX ADMIN — Medication 600 MILLIGRAM(S): at 06:08

## 2023-12-23 RX ADMIN — Medication 975 MILLIGRAM(S): at 10:42

## 2023-12-23 RX ADMIN — Medication 975 MILLIGRAM(S): at 03:36

## 2023-12-23 RX ADMIN — Medication 600 MILLIGRAM(S): at 12:15

## 2023-12-23 RX ADMIN — SODIUM CHLORIDE 3 MILLILITER(S): 9 INJECTION INTRAMUSCULAR; INTRAVENOUS; SUBCUTANEOUS at 06:11

## 2023-12-23 RX ADMIN — Medication 1 TABLET(S): at 12:15

## 2023-12-23 NOTE — PROGRESS NOTE ADULT - ASSESSMENT
A/P:  CHENTE HER is a 28y  now PPD#1 s/p spontaneous vaginal delivery at 39wd gestation s/p labor, SROM, s/p cerclage. Uncomplicated delivery.   -Vital signs stable  -Hgb: 12.9 -> AM labs pending   -Voiding, tolerating PO  -Advance care as tolerated   -Continue routine postpartum care and education  -Healthy male infant, declines circumcision  -Dispo: Anticipate discharge to home tomorrow pending attending approval.

## 2023-12-23 NOTE — PROGRESS NOTE ADULT - SUBJECTIVE AND OBJECTIVE BOX
CHENTE HER is a 28y  now PPD#1 s/p spontaneous vaginal delivery at 39wd gestation s/p labor, SROM, s/p cerclage. Uncomplicated delivery.     S:    No acute events overnight.   The patient has no complaints.  Pain controlled with current treatment regimen.   She is ambulating without difficulty and tolerating PO.   + flatus/-BM/+ voiding   She endorses appropriate lochia, which is decreasing.   She is breastfeeding without difficulty.   She denies fevers, chills, nausea and vomiting.   She denies lightheadedness, dizziness, palpitations, chest pain and SOB.     O:    T(C): 36.8 (23 @ 20:28), Max: 37.2 (23 @ 07:34)  HR: 96 (23 @ 20:28) (78 - 96)  BP: 103/68 (23 @ 20:28) (99/66 - 103/68)  RR: 17 (23 @ 20:28) (17 - 18)  SpO2: 99% (23 @ 20:28) (96% - 99%)    Gen: NAD, AOx3, resting comfortably on room air   Abdomen:  Soft, non-tender, non-distended  Uterus:  Fundus firm below umbilicus  VE:  Expected lochia  Ext:  b/l LE non-tender                           12.9   9.74  )-----------( 303      ( 21 Dec 2023 14:27 )             36.8

## 2023-12-23 NOTE — DISCHARGE NOTE OB - CARE PROVIDER_API CALL
Sada Hallman  Obstetrics and Gynecology  [address] [address]  Phone: [phone]  Fax: [phone]  Follow Up Time: 2 weeks   Sada Hallman  Obstetrics and Gynecology  [address] [address]  Phone: [phone]  Fax: [phone]  Follow Up Time: 2 weeks

## 2023-12-23 NOTE — DISCHARGE NOTE OB - PATIENT PORTAL LINK FT
You can access the FollowMyHealth Patient Portal offered by [facility] by registering at the following website: [email or web address]. By joining [facility]’s FollowMyHealth portal, you will also be able to view your health information using other applications (apps) compatible with our system. You can access the FollowMyHealth Patient Portal offered by [facility] by registering at the following website: [email or web address]. By joining [facility]’s FollowMyHealth portal, you will also be able to view your health information using other applications (apps) compatible with our system.

## 2023-12-23 NOTE — PROGRESS NOTE ADULT - ATTENDING COMMENTS
28y  now PPD#1 s/p spontaneous vaginal delivery at 39wd gestation s/p labor,  - currently with minimal bleeding/lochia only, no symptoms of anemia, post partum Hgb 10.8  - healthy male infant at bedside, declines circumcision  - vital signs, lab results, and physical exam reassuring   - DVT PPX: ambulation  - anticipated discharge: cleared for discharge

## 2023-12-23 NOTE — DISCHARGE NOTE OB - MEDICATION SUMMARY - MEDICATIONS TO STOP TAKING
I will STOP taking the medications listed below when I get home from the hospital:    indomethacin 25 mg oral capsule  -- 1 cap(s) by mouth every 6 hours

## 2023-12-23 NOTE — DISCHARGE NOTE OB - HOSPITAL COURSE
Patient underwent a normal spontaneous vaginal delivery. Post-partum course was uncomplicated. Pain is well controlled with PRN medication. She has no difficulty with ambulation, voiding, or PO intake. Lab values and vital signs are within normal limits prior to discharge. Pt had a history indicated cerclage during her pregnancy

## 2023-12-23 NOTE — DISCHARGE NOTE OB - MATERIALS PROVIDED
[facility] Screening Program/Breastfeeding Log/Breastfeeding Mother’s Support Group Information/Guide to Postpartum Care/Back To Sleep Handout/Shaken Baby Prevention Handout [facility] [place] Screening Program/Breastfeeding Log/Breastfeeding Mother’s Support Group Information/Guide to Postpartum Care/Back To Sleep Handout/Shaken Baby Prevention Handout

## 2023-12-23 NOTE — DISCHARGE NOTE OB - MEDICATION SUMMARY - MEDICATIONS TO TAKE
I will START or STAY ON the medications listed below when I get home from the hospital:    vit B6  -- Indication: For vitamin    ibuprofen 600 mg oral tablet  -- 1 tab(s) by mouth every 6 hours  -- Indication: For pain    acetaminophen 325 mg oral tablet  -- 3 tab(s) by mouth every 6 hours  -- Indication: For pain    Unisom SleepMelts 25 mg oral tablet, disintegrating  -- 2 orally  -- Indication: For sleeping    Prenatal 1 oral capsule  -- orally  -- Indication: For pnv

## 2023-12-23 NOTE — DISCHARGE NOTE OB - NS MD DC FALL RISK RISK
For information on Fall & Injury Prevention, visit: [email or web address] OR  [email or web address] OR  https://www.cdc.gov/steadi/patient.html For information on Fall & Injury Prevention, visit: [email or web address]/[email or web address]-mobility OR  [email or web address]/news/[email or web address]-tips-[email or web address]-avoid-injury OR  https://www.cdc.gov/steadi/patient.html
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
## 2023-12-27 ENCOUNTER — APPOINTMENT (OUTPATIENT)
Dept: OBGYN | Facility: CLINIC | Age: 28
End: 2023-12-27

## 2024-01-11 ENCOUNTER — APPOINTMENT (OUTPATIENT)
Dept: OBGYN | Facility: CLINIC | Age: 29
End: 2024-01-11
Payer: MEDICAID

## 2024-01-11 VITALS
SYSTOLIC BLOOD PRESSURE: 110 MMHG | BODY MASS INDEX: 31.02 KG/M2 | WEIGHT: 158 LBS | HEIGHT: 60 IN | DIASTOLIC BLOOD PRESSURE: 60 MMHG

## 2024-01-11 PROCEDURE — 99213 OFFICE O/P EST LOW 20 MIN: CPT | Mod: TH

## 2024-01-12 NOTE — HISTORY OF PRESENT ILLNESS
[FreeTextEntry1] : 29 y/o now  s/p  at 39w on .  Delivery History: Uncomplicated delivery, , Hgb 10.8 at time of discharge.  Male infant, 8lbs 0oz, APGARS 9/9.   Current Status: - Breast: breast and bottle feeding, reports no difficulty or issues with latching, denies any breast tenderness/discomfort/pain, denies nipple cracking/pain/bleeding/discharge. - Mood: denies any significant anxiety or depression related symptoms, feels well supported at home, feels confident and comfortable with infant care. - Lochia: minimal. Denies any dizziness, lightheadedness, feeling faint, SOB, or MATHUR. - Menses: has not resumed yet. Denies any abnormal/irregular/heavy bleeding. - Sexual intercourse: has not resumed. - Contraception: does not desire  - Last PAP: May 2023, NILM  Symptoms: no fever, chills, malaise, or myalgia. No issues with headaches. Reports no sleep or appetite disturbances. Denies any issues with voiding/BM.   -

## 2024-01-12 NOTE — DISCUSSION/SUMMARY
[FreeTextEntry1] : 27 y/o now  s/p  at 39w on . - [place] screen negative, mood appropriate, feels well taking care of baby - discussed continuing prenatal vitamins - discussed continued BF for 6 months, exclusively if possible (benefits explained) - discussed adequate diet and physical activity  - Contraception: does not desire  - Last PAP: May 2023, NILM  She verbalized understanding and agreement with above counseling regarding differential diagnosis, evaluation, and plan.  She was given time for questions/concerns which were all answered to her apparent satisfaction. All designated lab work for today drawn in office.   RTO 4w for final PP visit

## 2024-02-08 ENCOUNTER — APPOINTMENT (OUTPATIENT)
Dept: OBGYN | Facility: CLINIC | Age: 29
End: 2024-02-08
Payer: MEDICAID

## 2024-02-08 VITALS
WEIGHT: 159 LBS | DIASTOLIC BLOOD PRESSURE: 62 MMHG | SYSTOLIC BLOOD PRESSURE: 118 MMHG | BODY MASS INDEX: 31.22 KG/M2 | HEIGHT: 60 IN

## 2024-02-09 NOTE — HISTORY OF PRESENT ILLNESS
[HIV Test offered] : HIV Test offered [Syphilis test offered] : Syphilis test offered [Gonorrhea test offered] : Gonorrhea test offered [Chlamydia test offered] : Chlamydia test offered [Y] : Positive pregnancy history [N] : Patient is not sexually active [Previously active] : previously active [PapSmeardate] : 5/22/23 [TextBox_31] : neg [HIVDate] : 12/6/23 [TextBox_53] : neg [SyphilisDate] : 12/6/23 [TextBox_58] : neg [GonorrheaDate] : 5/22/23 [TextBox_63] : neg [ChlamydiaDate] : 5/22/23 [TextBox_68] : neg [LMPDate] : 3/24/23 [PGHxTotal] : 4 [[facility] CenterxFullTerm] : 2 [[facility] CenterxLiving] : 2 [PGHxABSpont] : 2 [FreeTextEntry1] : 3/24/23

## 2024-02-09 NOTE — DISCUSSION/SUMMARY
[FreeTextEntry1] : 29 y/o now  s/p  at 39w on . - [place] screen negative, mood appropriate, feels well taking care of baby - discussed continuing prenatal vitamins - discussed continued BF for 6 months, exclusively if possible (benefits explained) - discussed adequate diet and physical activity - Contraception: does not desire - Last PAP: May 2023, NILM  She verbalized understanding and agreement with above counseling regarding differential diagnosis, evaluation, and plan. She was given time for questions/concerns which were all answered to her apparent satisfaction. All designated lab work for today drawn in office.  RTO May/ for ANNUAL

## 2024-02-09 NOTE — REASON FOR VISIT
[Follow-Up] : a follow-up evaluation of [FreeTextEntry2] : PPA-  ON 23 MALE 8LBS 0OZ, BOTTLE AND BREAST FEEDING

## 2024-05-07 NOTE — OB RN DELIVERY SUMMARY - NSNUMBEROFNEWBORNS_OBGYN_ALL_OB_NU
ED Physician Note      Patient : Jacob Tejada Age: 73 year old Sex: male   MRN: [id number] Encounter Date: 5/7/2024      History     Chief Complaint   Patient presents with    Weakness       HPI: 73 year old male presents with past medical history of prostrate cancer, diabetes, HTN, HLD, and stroke with right side residual weakness who presents for weakness.  Patient was with his wife who provided additional history.  Patient was reported to have a mechanical fall two days ago.   He was trying to reach his medicine and fell.  It was an unwitnessed fall.  He hit his head on floor but did not lose consciousness.  Not on  blood thinners.  Wife reports that the patient has been hypotensive after his fall.  Patient also had a fall within few weeks ago and was noted to have a fracture to his L3..  He also was noted to have sclerotic lesions at the levels of T12 and L1 that is consistent with metastatic disease.  Patient also has not been eating and drinking as much this past week.  He does not have headache, change in vision, chest pain, shortness of breath, nausea, vomiting, diarrhea, fever, or chills.       Allergies   Allergen Reactions    Penicillins SWELLING and SHORTNESS OF BREATH     Other reaction(s): Other (See Comments), Unknown  Cerner Allergy Text Annotation: penicillins      Meperidine SWELLING     Cerner Allergy Text Annotation: Demerol HCl; History obtained Per NSICU RN    Demerol SWELLING       Past Medical History:   Diagnosis Date    Acute COVID-19 12/2/2022    BPH with elevated PSA 4/17/2023    Cerebral infarction (CMD)     2010, 2016    Diabetes mellitus (CMD)     Dyslipidemia     Hard of hearing     Hypertension     Vascular parkinsonism (CMD)        Past Surgical History:   Procedure Laterality Date    REMV 2ND CATARACT,CORN-SCLER SECTN Right        Family History   Problem Relation Age of Onset    Cancer Mother     Diabetes Mother     Hypertension Mother     Myocardial Infarction Father     Diabetes  Sister     Patient is unaware of any medical problems Brother     Patient is unaware of any medical problems Son     Hypertension Brother     Patient is unaware of any medical problems Son        Social History     Tobacco Use    Smoking status: Former    Smokeless tobacco: Never   Vaping Use    Vaping status: never used   Substance Use Topics    Alcohol use: Yes     Alcohol/week: 7.0 standard drinks of alcohol     Types: 7 Glasses of wine per week     Comment: 1 glass of wine every day    Drug use: Never          Physical Exam     ED Triage Vitals   ED Triage Vitals Group      Temp 05/07/24 0928 95.4 °F (35.2 °C)      Heart Rate 05/07/24 0928 81      Resp 05/07/24 0928 15      BP 05/07/24 0928 (!) 73/47      SpO2 05/07/24 0928 98 %      EtCO2 mmHg --       Height 05/07/24 1045 5' 8\" (1.727 m)      Weight 05/07/24 0938 183 lb 3.2 oz (83.1 kg)      Weight Scale Used 05/07/24 1045 Scale in bed      BMI (Calculated) --       IBW/kg (Calculated) 05/07/24 1045 68.4       Physical Exam:  Constitutional: No acute distress, resting comfortably in bed.   Eyes: No scleral icterus, EOM intact.  ENT: Atraumatic external nose and ears. Mucous membranes moist.   Cardiovascular: Regular rate, regular rhythm, normal S1 and S2, no murmurs, rubs, or gallops. 2+ radial, DP, and PT pulses bilaterally. No lower extremity edema bilaterally.  Respiratory: Normal respiratory effort, lungs clear to auscultation without crackles or wheezes. Equal breath sounds bilaterally.   GI: Abdomen soft, non-distended, normoactive bowel sounds, non-tender throughout. No rebound, guarding, or rigidity.   : No tenderness with percussion of bilateral CVA.  MSK: Head is normocephalic/atraumatic.  Neck is supple.  No anterior chest wall tenderness to palpation. Extremities are without deformities, cyanosis, or clubbing.   Skin: Warm and dry  Neuro: Alert and oriented.  Psych: Appropriate mood and affect, cooperative with exam.     Initial impression:  Patient is a 73-year-old male with history as above who presented for weakness.  Patient is afebrile, hypotensive, not tachycardic, not hypoxic.  Differential includes sepsis, ACS, metabolic abnormalities, electrolyte abnormalities.  Patient was given fluids, was responsive.  Bedside ultrasound showed no free fluid and no pericardial effusion.  Trauma was consulted, no intervention was needed.  EKG showed normal sinus rhythm, compared with prior EKG there is similar depressions, HR 72, QTc 427.  Chest x-ray showed no consolidation or effusion. Labs show hyponatremia 127, hyperkalemia 6.0, elevated BUN 76.  .  Troponin 11, normal.  Previous creatinine one month ago was 1.10, today it is 2.09,   GFR 33 today, one month ago 74.  Negative for COVID, influenza,  RSV.  CT head showed no acute intracranial abnormality.  CT cervical thoracic and lumbar spine were negative for fractures or dislocation.  CT abdomen showed no acute findings.  Patient was given treatment for hyperkalemia.  Nephrology was consulted.  Discussed findings and management with Dr. Smith.  Patient will be admitted.    Please see ED course for further details.      ED Course      ED Course as of 05/07/24 1636   Tue May 07, 2024   0951 EKG showed normal sinus rhythm, when compared with prior EKG, similar depressions HR 72, QTc 427 [MA]   0959 Creatinine 1.1 on 4/12 [AF]   1004 CXR shows no consolidation, poor inspiration.   [AF]   1004 Bedside FAST with no free fluid, no pericardial effusion [AF]   1004 Patient intermittently responsive to fluids.  Low threshold to start blood products, will not start pressors at this time since I do think the etiology is hypovolemia. [AF]   1017 Labs showed no leukocytosis, hemoglobin 9.3,  no thrombocytopenia.  [MA]   1019 Hb today 9, was 8.3 in 2023, none more recently [AF]   1026 Repeat EKG showed normal sinus rhythm, HR 75, Qtc 442 [MA]   1035 Lactate 2.3, already receiving fluids [AF]   1043 Chest x-ray showed  no consolidation or effusion [MA]   1045 Negative COVID, influenza, flu [MA]   1047 Labs show hyponatremia 127, hyperkalemia 6.0, elevated BUN 76.  .  Troponin 11, normal.  Previous creatinine one month ago was 1.10, today it is 2.09,   GFR 33 today, one month ago 74. [MA]   1101 Seen by trauma surgery, no acute intervention required [AF]   1111 CT head showed no demonstrated acute intracranial hemorrhage, infarction or  tumor   [MA]   1115 CT cervical spine showed no fractures or dislocation [MA]   1117 CT thoracolumbar spine showed no new fractures.  [MA]   1128 Nephrology is on consult, discussed with Dr. Beltran [MA]   1129 1.   Debris/secretions in the distal trachea and right-sided bronchi.  Bilateral pulmonary linear opacities favor atelectasis.  2.   No acute findings in the abdomen or pelvis.  3.   Sclerotic lesions in the spine, an posterior right fourth rib similar  to prior. Superior endplate compression deformity and sclerosis in the L3  vertebra, correlating with previously diagnosed fracture.  4.   Small urinary bladder calculus.   [AF]   1208 Discussed management and admission with Dr. Smith.  Patient will be admitted.  [MA]   1245 IVC compressible, will give a third liter of fluids. [AF]   1246 Consulted neurosurgery PA for L3 compression fracture, Dr Pollack put on consult [AF]   1248 UA shows trace ketones, trace proteins. [AF]   1248 Patient has had unstable BP, however at this time no concern for infection.  WBC 9.9, afebrile, and physiologically appears to be hypovolemic due to decreased PO intake.  CT c/a/p, UA shows no source if infection.  No abx. [AF]   1323 Spoke with [facility] hospitalist at bedside, aware of patient's borderline BP.  Comfortable with floor admit. [AF]   1510 Notified by RN that patient hypotensive again with MAP of 61, patient reevaluated he appears pale, he is generally confused with no focal deficits, bilateral lungs are clear to auscultation, no rales noted, his labs were  reviewed he does have a significant REEMA and appears clinically that he can tolerate fluids so we will continue to IV fluid resuscitate him.  He appears fairly pale and has a hemoglobin drop of 2 g from the beginning of June thus we will check his stool for melena, we also reached out to ICU to evaluate patient as well.   [AF-2]   1514 ICU aware of patient. Rectal exam negative for occult blood. Brown stool on fingertip [JM]      ED Course User Index  [AF] Jia Ac MD  [AF-2] Nuzhat Hickman DO  [JM] Marck Lawson MD  [MA] Ivan Garcia MD             Lab Results   (I have personally reviewed all lab results that have resulted prior to the end of my scheduled shift)  Results for orders placed or performed during the hospital encounter of 05/07/24   Comprehensive Metabolic Panel   Result Value Ref Range    Fasting Status      Sodium 127 (L) 135 - 145 mmol/L    Potassium 6.0 (H) 3.4 - 5.1 mmol/L    Chloride 100 97 - 110 mmol/L    Carbon Dioxide 19 (L) 21 - 32 mmol/L    Anion Gap 14 7 - 19 mmol/L    Glucose 144 (H) 70 - 99 mg/dL    BUN 76 (H) 6 - 20 mg/dL    Creatinine 2.09 (H) 0.67 - 1.17 mg/dL    Glomerular Filtration Rate 33 (L) >=60    BUN/Cr 36 (H) 7 - 25    Calcium 10.8 (H) 8.4 - 10.2 mg/dL    Bilirubin, Total 0.8 0.2 - 1.0 mg/dL    GOT/AST 11 <=37 Units/L    GPT/ALT <6 <64 Units/L    Alkaline Phosphatase 145 (H) 45 - 117 Units/L    Albumin 3.2 (L) 3.6 - 5.1 g/dL    Protein, Total 7.6 6.4 - 8.2 g/dL    Globulin 4.4 (H) 2.0 - 4.0 g/dL    A/G Ratio 0.7 (L) 1.0 - 2.4   TROPONIN I, HIGH SENSITIVITY   Result Value Ref Range    Troponin I, High Sensitivity 11 <77 ng/L   Magnesium   Result Value Ref Range    Magnesium 2.1 1.7 - 2.4 mg/dL   Lactic Acid Venous With Reflex   Result Value Ref Range    Lactate, Venous 2.3 (HH) 0.0 - 2.0 mmol/L   NT proBNP   Result Value Ref Range    NT-proBNP 571 (H) <=125 pg/mL   COVID/Flu/RSV panel   Result Value Ref Range    Rapid SARS-COV-2 by PCR Not Detected Not Detected  / Detected / Presumptive Positive / Inhibitors present    Influenza A by PCR Not Detected Not Detected    Influenza B by PCR Not Detected Not Detected    RSV BY PCR Not Detected Not Detected    Isolation Guidelines      Procedural Comment     CBC with Automated Differential (performable only)   Result Value Ref Range    WBC 9.9 4.2 - 11.0 K/mcL    RBC 2.81 (L) 4.50 - 5.90 mil/mcL    HGB 9.3 (L) 13.0 - 17.0 g/dL    HCT 26.8 (L) 39.0 - 51.0 %    MCV 95.4 78.0 - 100.0 fl    MCH 33.1 26.0 - 34.0 pg    MCHC 34.7 32.0 - 36.5 g/dL    RDW-CV 13.1 11.0 - 15.0 %    RDW-SD 45.9 39.0 - 50.0 fL     140 - 450 K/mcL    NRBC 0 <=0 /100 WBC    Neutrophil, Percent 61 %    Lymphocytes, Percent 32 %    Mono, Percent 5 %    Eosinophils, Percent 1 %    Basophils, Percent 1 %    Immature Granulocytes 0 %    Absolute Neutrophils 6.1 1.8 - 7.7 K/mcL    Absolute Lymphocytes 3.2 1.0 - 4.0 K/mcL    Absolute Monocytes 0.5 0.3 - 0.9 K/mcL    Absolute Eosinophils  0.1 0.0 - 0.5 K/mcL    Absolute Basophils 0.1 0.0 - 0.3 K/mcL    Absolute Immature Granulocytes 0.0 0.0 - 0.2 K/mcL   Prothrombin Time (INR/PT)   Result Value Ref Range    Protime- PT 10.1 9.7 - 11.8 sec    INR 0.9     Partial Thromboplastin Time (PTT)   Result Value Ref Range    PTT 27 22 - 32 sec   TYPE/SCREEN   Result Value Ref Range    ABO/RH(D) A Rh Positive     ANTIBODY SCREEN Negative     TYPE AND SCREEN EXPIRATION DATE 05/10/2024 23:59    Prepare Red Blood Cells   Result Value Ref Range    UNIT BLOOD TYPE O Pos     ISBT BLOOD TYPE 5100     BLOOD EXPIRATION DATE [id number]     UNIT NUMBER [id number]     DISPENSE STATUS Issued     PRODUCT ID Red Blood Cells     PRODUCT CODE [id number]     ISSUE DATE/TIME [id number]     PRODUCT DESCRIPTION RBC AS-1 LR          Radiology Results   (I will review all radiology final reads completed prior to the end of my scheduled shift)  CT HEAD WO CONTRAST   Final Result   No acute abnormalities.             Electronically Signed  by: HIWOT ROA M.D.    Signed on: 5/7/2024 11:05 AM    Workstation ID: [id number]      CT CHEST ABDOMEN PELVIS W CONTRAST   Final Result      1.   Debris/secretions in the distal trachea and right-sided bronchi.   Bilateral pulmonary linear opacities favor atelectasis.   2.   No acute findings in the abdomen or pelvis.   3.   Sclerotic lesions in the spine, an posterior right fourth rib similar   to prior. Superior endplate compression deformity and sclerosis in the L3   vertebra, correlating with previously diagnosed fracture.   4.   Small urinary bladder calculus.      Electronically Signed by: COLEEN CARCAMO M.D.    Signed on: 5/7/2024 11:24 AM    Workstation ID: [id number]      CT THORACIC AND LUMBAR SPINE 2D REFORMATTED   Final Result   No acute thoracolumbar vertebral column injury.         Electronically Signed by: RUDDY HERNANDEZ M.D.    Signed on: 5/7/2024 11:13 AM    Workstation ID: [id number]      CT CERVICAL SPINE WO CONTRAST   Final Result   No acute bony abnormalities.      Electronically Signed by: HIWOT ROA M.D.    Signed on: 5/7/2024 11:09 AM    Workstation ID: [id number]      XR CHEST PA OR AP 1 VIEW   Final Result      Grossly normal chest.               Electronically Signed by: RUDDY HERNANDEZ M.D.    Signed on: 5/7/2024 10:03 AM    Workstation ID: [id number]          Clinical Impression & ED Diagnosis 5/7/2024  12:21 PM  ED Diagnosis   1. Weakness        2. Hyperkalemia        3. Acute kidney injury (CMD)              Disposition        Admit 5/7/2024 12:19 PM  Telemetry Bed?: Yes  Admitting Physician: CUCA JONES [[id number]]  Is this a telephone or verbal order?: This is a telephone order from the admitting physician      Ivan Garcia MD   5/7/202412:20 PM  ED Medication Orders (From admission, onward)      Ordered Start     Status Ordering Provider    05/07/24 1144 05/07/24 1143  lidocaine 2% urethral (UROJET) 2 % jelly 10 mL  (ED Insert Victoria Panel)   ONCE PRN         Last MAR action: Given LANA GERARDO    05/07/24 1117 05/07/24 1130  dextrose 50 % injection 25 g  (HYPERKALEMIA for potassium greater than 5 WITHOUT ECG changes)  ONCE        Placed in \"Followed by\" Linked Group    Last MAR action: Given IVAN GARCIA    05/07/24 1117 05/07/24 1130  insulin regular human (HumuLIN R) (diluted) 1 unit/mL injection 10 Units  (HYPERKALEMIA for potassium greater than 5 WITHOUT ECG changes)  ONCE        Placed in \"Followed by\" Linked Group    Last MAR action: Given ALAGERARDO DAVISAFA    05/07/24 1117 05/07/24 1130  albuterol (VENTOLIN) nebulizer 5 mg  (HYPERKALEMIA for potassium greater than 5 WITHOUT ECG changes)  ONCE         Last MAR action: Given IVAN GARCIA    05/07/24 1020 05/07/24 1030  ondansetron (ZOFRAN) injection 4 mg  ONCE         Last MAR action: Given LANA GERARDO    05/07/24 1003 05/07/24 1015  sodium chloride (NORMAL SALINE) 0.9 % bolus 1,000 mL  ONCE         Last MAR action: New Bag LANA GERARDO    05/07/24 0931 05/07/24 0945  sodium chloride (NORMAL SALINE) 0.9 % bolus 1,000 mL  ONCE         Last MAR action: Completed LANA GERARDO          If a scribe was used.The documentation recorded by the scribe accurately and completely reflects the service(s) I personally performed and the decisions made by me.                            Ivan Garcia MD  Resident  05/07/24 7627     1

## 2024-06-13 ENCOUNTER — APPOINTMENT (OUTPATIENT)
Dept: OBGYN | Facility: CLINIC | Age: 29
End: 2024-06-13
Payer: MEDICAID

## 2024-06-13 VITALS
DIASTOLIC BLOOD PRESSURE: 68 MMHG | SYSTOLIC BLOOD PRESSURE: 110 MMHG | WEIGHT: 156 LBS | BODY MASS INDEX: 30.63 KG/M2 | HEIGHT: 60 IN

## 2024-06-13 DIAGNOSIS — Z01.419 ENCOUNTER FOR GYNECOLOGICAL EXAMINATION (GENERAL) (ROUTINE) W/OUT ABNORMAL FINDINGS: ICD-10-CM

## 2024-06-13 PROCEDURE — 99459 PELVIC EXAMINATION: CPT

## 2024-06-13 PROCEDURE — 99395 PREV VISIT EST AGE 18-39: CPT

## 2024-06-19 LAB — CYTOLOGY CVX/VAG DOC THIN PREP: NORMAL

## 2024-06-19 NOTE — DISCUSSION/SUMMARY
[FreeTextEntry1] : 28 y/o  LMP unknown, recent delivery, presenting for ANNUAL.    #Well Woman Visit 1. Nutrition/Activity: The benefits of balanced diet and physical activity discussed with patient.  2. Health Screening: She was informed of the benefits of a screening colonoscopy/DEXA/Mammo/Pap. - Cervix: We reviewed ASCCP/ACOG guidelines for pap smear screening. Last pap May 2023, NILM, desires rescreening today, collected.  3. Sex Health:  The importance of safe-sex practices was discussed with the patient. STD screening was offered to patient, she defers.  4. Contraception: does not desire contraception, counseled about safe sex practices and barrier protection use. 5. vaginal suture from previous cerclage: cerlcage suture protruding from vaginal fornix adjacent to cervix, no knot or loop, just a singular 2cm piece of suture -- suture tugged on with clamp without release or expulsion, patient with pain on tugging and minimal bleeding, discussed options of bringing back for full exam to remove under controlled situation or under sedation, she will think about it and schedule, discussed possible discomfort with intercourse or bleeding, precautions reviewed   She verbalized understanding and agreement with above counseling regarding differential diagnosis, evaluation, and plan.  She was given time for questions/concerns which were all answered to her apparent satisfaction. All designated lab work for today drawn in office.   RTO PRN for cerclage suture removal  RTO 1yr for next GYN ANNUAL

## 2024-06-19 NOTE — HISTORY OF PRESENT ILLNESS
[N] : Patient does not use contraception [Y] : Positive pregnancy history [No] : Patient does not have concerns regarding sex [Currently Active] : currently active [LMP unknown] : LMP unknown [unknown] : Patient is unsure of the date of her LMP [FreeTextEntry1] : 28 y/o  LMP unknown, recent delivery, presenting for ANNUAL.  Feels well, no complaints.  [PapSmeardate] : 05/22/2023 [TextBox_31] : NEG [PGHxTotal] : 4 [[facility]] : 2 [Page HospitalxLiving] : 2 [PGHxABSpont] : 2

## 2024-06-19 NOTE — PHYSICAL EXAM
[Chaperone Present] : A chaperone was present in the examining room during all aspects of the physical examination [[id number]] : A chaperone was present during the pelvic exam. [FreeTextEntry2] : MAT Coreas  [Appropriately responsive] : appropriately responsive [Alert] : alert [No Acute Distress] : no acute distress [Oriented x3] : oriented x3 [Labia Majora] : normal [Labia Minora] : normal [Normal] : normal [Uterine Adnexae] : normal [FreeTextEntry4] : cerlcage suture protruding from vaginal fornix adjacent to cervix, no knot or loop, just a singular 2cm piece of suture

## 2024-07-01 NOTE — OB RN PATIENT PROFILE - URINARY CATHETER
Your seen today for Medicare wellness visit  the blood pressure is is well controlled and continue with your medications  continue exercise and physical activity  make appointment for you mammogram, bone density  complete your cologuard test at home  make appointment with orthopedics and audiology  follow-up in 5 to 6 months  
no

## 2024-09-19 NOTE — OB RN TRIAGE NOTE - NS_TRIAGEPROVIDERNOTIFIEDBY_OBGYN_ALL_OB_FT
Quique RN [FreeTextEntry1] : I had a lengthy discussion with the patient in regard to treatment plan and diagnosis. There are no red flag findings on imaging nor are there any red flag findings on clinical exams. Therefore, we will continue with a course of conservative treatment. This would include physical therapy/home exercise program, Meloxicam, Tylenol, NSAIDs as medically indicated. The patient will follow up with me in approximately 4 to 6 weeks. I encouraged the patient to follow-up sooner if there are any new or worsening symptoms.

## 2024-10-01 ENCOUNTER — APPOINTMENT (OUTPATIENT)
Dept: OBGYN | Facility: CLINIC | Age: 29
End: 2024-10-01
Payer: MEDICAID

## 2024-10-01 PROCEDURE — ZZZZZ: CPT

## 2024-10-08 ENCOUNTER — APPOINTMENT (OUTPATIENT)
Dept: OBGYN | Facility: CLINIC | Age: 29
End: 2024-10-08
Payer: MEDICAID

## 2024-10-08 VITALS
TEMPERATURE: 97.3 F | WEIGHT: 156.25 LBS | RESPIRATION RATE: 18 BRPM | SYSTOLIC BLOOD PRESSURE: 122 MMHG | BODY MASS INDEX: 30.67 KG/M2 | DIASTOLIC BLOOD PRESSURE: 67 MMHG | HEIGHT: 60 IN | OXYGEN SATURATION: 98 % | HEART RATE: 54 BPM

## 2024-10-08 DIAGNOSIS — O34.30 MATERNAL CARE FOR CERVICAL INCOMPETENCE, UNSPECIFIED TRIMESTER: ICD-10-CM

## 2024-10-08 LAB
HCG UR QL: NEGATIVE
QUALITY CONTROL: YES

## 2024-10-08 PROCEDURE — 81025 URINE PREGNANCY TEST: CPT

## 2024-10-08 PROCEDURE — 59871 REMOVE CERCLAGE SUTURE: CPT

## 2024-10-09 ENCOUNTER — NON-APPOINTMENT (OUTPATIENT)
Age: 29
End: 2024-10-09

## 2024-10-22 ENCOUNTER — APPOINTMENT (OUTPATIENT)
Dept: OBGYN | Facility: CLINIC | Age: 29
End: 2024-10-22

## 2025-04-25 ENCOUNTER — EMERGENCY (EMERGENCY)
Facility: HOSPITAL | Age: 30
LOS: 1 days | End: 2025-04-25
Attending: EMERGENCY MEDICINE
Payer: COMMERCIAL

## 2025-04-25 VITALS
SYSTOLIC BLOOD PRESSURE: 117 MMHG | RESPIRATION RATE: 18 BRPM | TEMPERATURE: 98 F | HEART RATE: 92 BPM | DIASTOLIC BLOOD PRESSURE: 73 MMHG | OXYGEN SATURATION: 97 %

## 2025-04-25 PROCEDURE — 99284 EMERGENCY DEPT VISIT MOD MDM: CPT | Mod: 25

## 2025-04-25 PROCEDURE — 99283 EMERGENCY DEPT VISIT LOW MDM: CPT

## 2025-04-25 RX ORDER — DIPHENHYDRAMINE HCL 12.5MG/5ML
50 ELIXIR ORAL ONCE
Refills: 0 | Status: COMPLETED | OUTPATIENT
Start: 2025-04-25 | End: 2025-04-25

## 2025-04-25 RX ORDER — PREDNISONE 20 MG/1
50 TABLET ORAL ONCE
Refills: 0 | Status: COMPLETED | OUTPATIENT
Start: 2025-04-25 | End: 2025-04-25

## 2025-04-25 RX ORDER — PREDNISONE 20 MG/1
2 TABLET ORAL
Qty: 8 | Refills: 0
Start: 2025-04-25 | End: 2025-04-28

## 2025-04-25 RX ADMIN — Medication 10 MILLIGRAM(S): at 07:02

## 2025-04-25 RX ADMIN — PREDNISONE 50 MILLIGRAM(S): 20 TABLET ORAL at 05:47

## 2025-04-25 RX ADMIN — Medication 20 MILLIGRAM(S): at 05:47

## 2025-04-25 RX ADMIN — Medication 50 MILLIGRAM(S): at 05:47

## 2025-04-25 NOTE — ED PROVIDER NOTE - NSFOLLOWUPINSTRUCTIONS_ED_ALL_ED_FT
- Acuda a gigi yodit de seguimiento con stock[place] médico en un plazo de 2 a 3 días.   - Lleve los resultados a la yodit.  - Regrese al servicio de urgencias si aparecen síntomas nuevos o empeoran.  - Por favor, tome michelle medicamentos según lo recetado    Alergias    LO QUE NECESITA SABER:    ¿Qué son las alergias?Las alergias son gigi reacción del sistema inmunológico a gigi sustancia llamada alérgeno. El sistema inmunológico ve el alérgeno alfredito gigi amenaza y lo ataca.    ¿Qué provoca las alergias?Usted podría tener alergias luis antonio ciertas épocas del año o luis antonio todo el año. Las siguientes son alergias comunes:    Alergias estacionales de transmisión aéreaocurren luis antonio ciertas estaciones del año. Caban también se conoce alfredito fiebre del heno. Polen de árboles, hierba o pasto son ejemplos de alérgenos que usted respira.    Alergias ambientales de transmisión aéreadesencadenantes que usted podría respirar luis antonio todo el año incluyen polvo, moho y pelaje de mascotas.    Alergias de contactoincluye el látex, éste se encuentra por ejemplo en condones y guantes médicos. Las alergias al látex pueden ser muy serias.    Alergias por picadura de insectopueden ser causadas por abejas, avispón, hormigas coloradas u otros insectos podrían picarlo o morderlo. Las alergias a los insectos podrían ser muy serias.    Las alergias a los alimentoscomúnmente incluyen a los mariscos, al lawrence y a los huevos. Algunos alimentos deben consumirse para producir gigi reacción alérgica. Otros pueden desencadenar gigi reacción si tocan la piel o son inhalados.  ¿Qué aumenta mi riesgo de alergias?Las reacciones alérgicas pueden ocurrir en cualquier momento, incluso si usted no ha tenido alergias anteriormente. Usted podría desarrollar gigi alergia después de speedy estado expuesto a un alérgeno más de gigi vez. Las reacciones alérgicas son más comunes en niños y ancianos, sheng cualquier persona puede tener gigi reacción alérgica. [place] riesgo aumenta si usted tiene antecedentes familiares de alergias o gigi afección médica alfredito el asma.    ¿Cuáles son los signos y síntomas de las alergias?    Los síntomas levesincluyen estornudos y flujo, picazón o congestión nasal. También puede presentar ojos inflamados, llorosos o con picazón, o picazón en la piel. Puede tener inflamación o dolor en el área de la picadura de insecto.    Los síntomas de la anafilaxiaincluyen dificultad para respirar o tragar, un salpullido o ronchas al igual que inflamación severa. También puede tener tos, un elvira silbante y chillón en el pecho al exhalar conocido alfredito sibilancias, o gigi sensación de desvanecimiento o mareos. La anafilaxia es gigi reacción repentina y de peligro mortal que requiere de tratamiento inmediato.  ¿Cómo se diagnostican las alergias?[place] médico le preguntará acerca de michelle signos y síntomas. Le preguntará a qué alérgenos ha sido expuesto y si alguna vez ha tenido otras reacciones alérgicas. Es posible que le revise la nariz, los oídos o la garganta. Es probable que usted también necesite realizarse exámenes adicionales si desarrolló anafilaxia después de speedy estado expuesto a un factor desencadenante y luego hizo ejercicio. Caban se conoce alfredito anafilaxia inducida por el ejercicio. También es posible que deba hacerse los siguientes exámenes:    Los análisis de sangrese usan para buscar signos de gigi reacción a los alérgenos.    Exámenes nasalesse usa para encontrar cómo reaccionan los conductos nasales a los alérgenos. Gigi muestra de [place] líquido nasal también puede ser analizado.    Los exámenes de pielpodrían ayudar al médico a determinar a qué es usted alérgico. Se colocará gigi pequeña cantidad de alérgeno en stock[place] brazo o espalda. Después le pincharán la piel con gigi aguja. El médico observará cómo reacciona la piel al alérgeno.  ¿Cómo se tratan las alergias?    Los antihistamínicossirven para reducir el comezón, estornudo e inflamación. Usted los puede nasrin en forma de píldora o de gotas en michelle ojos o nariz.    Los descongestivosayudan a que stock[place] nariz se sienta menos congestionada.    Los esteroidesdisminuyen la inflamación y el enrojecimiento.    Los tratamientos de uso tópicoayudan a reducir el comezón o inflamación. Usted también podría recibir aerosoles nasales o gotas para los ojos.    La epinefrinaes un medicamento usado para tratar reacciones alérgicas graves alfredito la anafilaxia.    La insensibilizaciónhace que el cuerpo se acostumbre a los alérgenos que no puede evitar. [place] médico le administra gigi inyección que contiene gigi pequeña cantidad de alérgeno. Se tratará cualquier reacción alérgica que tenga. Le administrará más alérgeno poco a poco hasta que stock[place] cuerpo se acostumbre a éste. [place] reacción al alérgeno podría ser menos seria después de éste tratamiento. [place] médico le indicará por cuánto tiempo usted va a utilizar las inyecciones.  ¿Qué pasos necesito seguir cuando hay señales o síntomas de anafilaxia?    Inmediatamenteaplíquese 1 inyección de epinefrina sheng hágalo solamente en el músculo del muslo que da hacia afuera.    Deje la inyección en el lugarsegún las indicaciones. Es probable que stock[place] médico le recomiende que se la deje puesta por hasta 10 segundos antes de quitarla. Caban ayuda a asegurarse de que toda la epinefrina sea aplicada.    Llame al 911 y vaya al servicio de urgencias,aunque la inyección haya jacquie michelle síntomas. No maneje usted mismo. Lleve con usted la inyección de epinefrina que usó.  ¿Qué precauciones de seguridad necesito seguir si estoy en riesgo de presentar anafilaxia?    Tenga a mano 2 inyecciones de epinefrina en todo momento.Puede que usted necesite gigi segunda inyección ya que la epinefrina solamente actúa por aproximadamente 20 minutos y los síntomas podrían regresar. [place] médico puede mostrarle a usted y a michelle familiares cómo aplicar la inyección. Revise la fecha de vencimiento todos los meses y reemplace el medicamento de stock[place] vencimiento.    Elabore un plan de acción.[place] médico puede ayudarle a crear un plan escrito que explique la alergia y un plan de emergencia para tratar gigi reacción. El plan explica cuándo aplicar gigi segunda inyección de epinefrina si los síntomas vuelven o no mejoran después de la primera inyección. Asegúrese de que michelle familiares y personal de stock[place] trabajo tengan copias del plan de acción y las instrucciones de emergencia. Muéstreles cómo aplicar la inyección de epinefrina.    Tenga cuidado cuando sharron ejercicio.Si usted tuvo anafilaxis inducida por el ejercicio, no se ejercite inmediatamente después de comer. Pare de ejercitarse de inmediato si empieza a desarrollar cualquier signo o síntoma de anafilaxia. Puede que al principio se sienta cansado, caliente o tenga comezón en la piel. También podría desarrollar urticaria, inflamación y problemas graves de respiración si continúa ejercitándose.    Lleve gigi identificación de alerta médica.Use un brazalete o collar de alerta médica o lleve gigi tarjeta que explique la alergia. Pregúntele a [place] médico dónde conseguir estos artículos.  Accesorios de alerta médica      Informe a todos los médicos sobre la alergia.Estos incluyen a los odontólogos, enfermeras, médicos y cirujanos.  ¿Cómo puedo controlar las alergias?    Use enjuagues nasales según las indicaciones.Hágase enjuagues con gigi solución salina diariamente. Caban ayudará a eliminar los alérgenos de stock[place] nariz. Use agua destilada, si es posible. También puede hervir agua del grifo y dejar que se enfríe antes de usarla. No utilice agua del grifo que no haya sido hervida.    No fume.Los síntomas de la alergia disminuyen si no está alrededor del humo. La nicotina y otras sustancias químicas que contienen los cigarrillos y cigarros pueden dañar los pulmones. Pida información a stock[place] médico si usted actualmente fuma y necesita ayuda para dejar de fumar. Los cigarrillos electrónicos o el tabaco sin humo igualmente contienen nicotina. Consulte con stock[place] médico antes de utilizar estos productos.  ¿Cómo puedo prevenir gigi reacción alérgica?    No salga afuera cuando el recuento de polen esté muy alto en torrey de sufrir de alergias estacionales.Michelle síntomas podrían mejorar si usted sale afuera solo por la mañana o la noche. Use el aire acondicionado y cambie los filtros de aire a menudo.    Evite el polvo, pelaje y moho.Limpie el polvo y aspire stock[place] hogar a menudo. Es posible que usted necesite usar gigi máscara al hacerlo. Mantenga a las mascotas en ciertas habitaciones y báñelos frecuentemente. Use un deshumidificador (máquina que reduce la humedad) para ayudar a evitar el moho.    No use productos que contengan látex en torrey de tener alergia al látex.Si usted trabaja en la jaret de la karlie o preparando alimentos, utilice guantes sin látex. Siempre informe a los médicos si usted tiene gigi alergia al látex.    Evite áreas o actividades que atraen a los insectos en torrey que usted tenga gigi alergia a las picaduras de insectos.Las áreas incluyen los botes de basura, jardín y casi de alex. No use ropa de colores brillantes ni perfumes deon al estar afuera.    Evite gigi reacción alérgica causada por los alimentos.Puede tener gigi reacción si stock[place] comida no se prepara de un modo seguro. Por ejemplo, podrían servirle comida que haya estado en contacto con el alimento desencadenante luis antonio la preparación. Caban se conoce alfredito contaminación cruzada. Las herramientas de la cocina también pueden causar contaminación cruzada. También puede comer productos horneados que contienen un alimento desencadenante que usted desconoce. Pregunte si el producto contiene el alimento desencadenante antes de manipularlo o comerlo.  Llame al 911 en torrey de presentar signos o síntomas de anafilaxia,alfredito dificultad para respirar, inflamación en stock[place] boca o garganta, o sibilancias. También es posible que tenga comezón, sarpullido, urticaria o sienta que se va a desmayar.    ¿Cuándo raudel buscar atención inmediata?    Tiene la sensación de hormigueo en las gautam o pies.    [place] piel está enrojecida o ruborizada.  ¿Cuándo raudel comunicarme con mi médico?    Usted tiene preguntas o inquietudes acerca de stockton condición o cuidado.

## 2025-04-25 NOTE — ED PROVIDER NOTE - CLINICAL SUMMARY MEDICAL DECISION MAKING FREE TEXT BOX
31 yo F with no significant PMH presenting with hives, lip swelling since 2 am. On exam pt hds, DANIELLA with no wheezes, stridor; edematous upper lip and bilateral periorbital swelling, oropharynx with no edema, erythematous papular rash diffusely to abdomen arms, back face. Concern for allergic reaction. Ordered benadryl famotidine prednisone. 31 yo F with no significant PMH presenting with hives, lip swelling since 2 am. On exam pt hds, DANIELLA with no wheezes, stridor; edematous upper lip and bilateral periorbital swelling, oropharynx with no edema, erythematous papular rash diffusely to abdomen arms, back face. Concern for allergic reaction. Ordered benadryl famotidine prednisone. Patient with improvement with PO meds.

## 2025-04-25 NOTE — ED PROVIDER NOTE - PROGRESS NOTE DETAILS
Allergic reaction improved s/p medications. Pt reports itching significantly better. Swelling markedly improved as well. Pt feels well and would like to discharge home. Gave strict return precautions. Pt to follow up with allergist. Pt instructed to buy OTC allergy meds and to take prednisone. Rx sent.

## 2025-04-25 NOTE — ED PROVIDER NOTE - CARE PROVIDER_API CALL
Girish Whitney J  Allergy and Immunology  [address] [place] [address]  Phone: [phone]  Fax: [phone]  Follow Up Time: Routine

## 2025-04-25 NOTE — ED ADULT TRIAGE NOTE - BP NONINVASIVE SYSTOLIC (MM HG)
Detail Level: Generalized Spironolactone Pregnancy And Lactation Text: This medication can cause feminization of the male fetus and should be avoided during pregnancy. The active metabolite is also found in breast milk. Topical Sulfur Applications Counseling: Topical Sulfur Counseling: Patient counseled that this medication may cause skin irritation or allergic reactions.  In the event of skin irritation, the patient was advised to reduce the amount of the drug applied or use it less frequently.   The patient verbalized understanding of the proper use and possible adverse effects of topical sulfur application.  All of the patient's questions and concerns were addressed. Bactrim Pregnancy And Lactation Text: This medication is Pregnancy Category D and is known to cause fetal risk.  It is also excreted in breast milk. Minocycline Counseling: Patient advised regarding possible photosensitivity and discoloration of the teeth, skin, lips, tongue and gums.  Patient instructed to avoid sunlight, if possible.  When exposed to sunlight, patients should wear protective clothing, sunglasses, and sunscreen.  The patient was instructed to call the office immediately if the following severe adverse effects occur:  hearing changes, easy bruising/bleeding, severe headache, or vision changes.  The patient verbalized understanding of the proper use and possible adverse effects of minocycline.  All of the patient's questions and concerns were addressed. Azithromycin Counseling:  I discussed with the patient the risks of azithromycin including but not limited to GI upset, allergic reaction, drug rash, diarrhea, and yeast infections. Detail Level: Zone Tazorac Pregnancy And Lactation Text: This medication is not safe during pregnancy. It is unknown if this medication is excreted in breast milk. Isotretinoin Counseling: Patient should get monthly blood tests, not donate blood, not drive at night if vision affected, not share medication, and not undergo elective surgery for 6 months after tx completed. Side effects reviewed, pt to contact office should one occur. Tazorac Counseling:  Patient advised that medication is irritating and drying.  Patient may need to apply sparingly and wash off after an hour before eventually leaving it on overnight.  The patient verbalized understanding of the proper use and possible adverse effects of tazorac.  All of the patient's questions and concerns were addressed. Benzoyl Peroxide Pregnancy And Lactation Text: This medication is Pregnancy Category C. It is unknown if benzoyl peroxide is excreted in breast milk. Dapsone Pregnancy And Lactation Text: This medication is Pregnancy Category C and is not considered safe during pregnancy or breast feeding. Azithromycin Pregnancy And Lactation Text: This medication is considered safe during pregnancy and is also secreted in breast milk. Isotretinoin Pregnancy And Lactation Text: This medication is Pregnancy Category X and is considered extremely dangerous during pregnancy. It is unknown if it is excreted in breast milk. Doxycycline Pregnancy And Lactation Text: This medication is Pregnancy Category D and not consider safe during pregnancy. It is also excreted in breast milk but is considered safe for shorter treatment courses. 117 Tetracycline Counseling: Patient counseled regarding possible photosensitivity and increased risk for sunburn.  Patient instructed to avoid sunlight, if possible.  When exposed to sunlight, patients should wear protective clothing, sunglasses, and sunscreen.  The patient was instructed to call the office immediately if the following severe adverse effects occur:  hearing changes, easy bruising/bleeding, severe headache, or vision changes.  The patient verbalized understanding of the proper use and possible adverse effects of tetracycline.  All of the patient's questions and concerns were addressed. Patient understands to avoid pregnancy while on therapy due to potential birth defects. Topical Clindamycin Counseling: Patient counseled that this medication may cause skin irritation or allergic reactions.  In the event of skin irritation, the patient was advised to reduce the amount of the drug applied or use it less frequently.   The patient verbalized understanding of the proper use and possible adverse effects of clindamycin.  All of the patient's questions and concerns were addressed. Topical Retinoid counseling:  Patient advised to apply a pea-sized amount only at bedtime and wait 30 minutes after washing their face before applying.  If too drying, patient may add a non-comedogenic moisturizer. The patient verbalized understanding of the proper use and possible adverse effects of retinoids.  All of the patient's questions and concerns were addressed. Doxycycline Counseling:  Patient counseled regarding possible photosensitivity and increased risk for sunburn.  Patient instructed to avoid sunlight, if possible.  When exposed to sunlight, patients should wear protective clothing, sunglasses, and sunscreen.  The patient was instructed to call the office immediately if the following severe adverse effects occur:  hearing changes, easy bruising/bleeding, severe headache, or vision changes.  The patient verbalized understanding of the proper use and possible adverse effects of doxycycline.  All of the patient's questions and concerns were addressed. Birth Control Pills Counseling: Birth Control Pill Counseling: I discussed with the patient the potential side effects of OCPs including but not limited to increased risk of stroke, heart attack, thrombophlebitis, deep venous thrombosis, hepatic adenomas, breast changes, GI upset, headaches, and depression.  The patient verbalized understanding of the proper use and possible adverse effects of OCPs. All of the patient's questions and concerns were addressed. Topical Sulfur Applications Pregnancy And Lactation Text: This medication is Pregnancy Category C and has an unknown safety profile during pregnancy. It is unknown if this topical medication is excreted in breast milk. Minocycline Pregnancy And Lactation Text: This medication is Pregnancy Category D and not consider safe during pregnancy. It is also excreted in breast milk. High Dose Vitamin A Counseling: Side effects reviewed, pt to contact office should one occur. Bactrim Counseling:  I discussed with the patient the risks of sulfa antibiotics including but not limited to GI upset, allergic reaction, drug rash, diarrhea, dizziness, photosensitivity, and yeast infections.  Rarely, more serious reactions can occur including but not limited to aplastic anemia, agranulocytosis, methemoglobinemia, blood dyscrasias, liver or kidney failure, lung infiltrates or desquamative/blistering drug rashes. Erythromycin Counseling:  I discussed with the patient the risks of erythromycin including but not limited to GI upset, allergic reaction, drug rash, diarrhea, increase in liver enzymes, and yeast infections. Sarecycline Counseling: Patient advised regarding possible photosensitivity and discoloration of the teeth, skin, lips, tongue and gums.  Patient instructed to avoid sunlight, if possible.  When exposed to sunlight, patients should wear protective clothing, sunglasses, and sunscreen.  The patient was instructed to call the office immediately if the following severe adverse effects occur:  hearing changes, easy bruising/bleeding, severe headache, or vision changes.  The patient verbalized understanding of the proper use and possible adverse effects of sarecycline.  All of the patient's questions and concerns were addressed. Birth Control Pills Pregnancy And Lactation Text: This medication should be avoided if pregnant and for the first 30 days post-partum. Erythromycin Pregnancy And Lactation Text: This medication is Pregnancy Category B and is considered safe during pregnancy. It is also excreted in breast milk. Benzoyl Peroxide Counseling: Patient counseled that medicine may cause skin irritation and bleach clothing.  In the event of skin irritation, the patient was advised to reduce the amount of the drug applied or use it less frequently.   The patient verbalized understanding of the proper use and possible adverse effects of benzoyl peroxide.  All of the patient's questions and concerns were addressed. Use Enhanced Medication Counseling?: No Spironolactone Counseling: Patient advised regarding risks of diarrhea, abdominal pain, hyperkalemia, birth defects (for female patients), liver toxicity and renal toxicity. The patient may need blood work to monitor liver and kidney function and potassium levels while on therapy. The patient verbalized understanding of the proper use and possible adverse effects of spironolactone.  All of the patient's questions and concerns were addressed. Topical Clindamycin Pregnancy And Lactation Text: This medication is Pregnancy Category B and is considered safe during pregnancy. It is unknown if it is excreted in breast milk. Dapsone Counseling: I discussed with the patient the risks of dapsone including but not limited to hemolytic anemia, agranulocytosis, rashes, methemoglobinemia, kidney failure, peripheral neuropathy, headaches, GI upset, and liver toxicity.  Patients who start dapsone require monitoring including baseline LFTs and weekly CBCs for the first month, then every month thereafter.  The patient verbalized understanding of the proper use and possible adverse effects of dapsone.  All of the patient's questions and concerns were addressed. High Dose Vitamin A Pregnancy And Lactation Text: High dose vitamin A therapy is contraindicated during pregnancy and breast feeding. Topical Retinoid Pregnancy And Lactation Text: This medication is Pregnancy Category C. It is unknown if this medication is excreted in breast milk. Detail Level: Simple

## 2025-04-25 NOTE — ED PROVIDER NOTE - PATIENT'S PREFERRED PRONOUN
Her/She Comment: Compound melanocytic nevus Biopsied 3/2019; monitor; if repigmentation appears Bx Detail Level: Simple Comment: Exc by another provider Render Risk Assessment In Note?: no

## 2025-04-25 NOTE — ED PROVIDER NOTE - ATTENDING CONTRIBUTION TO CARE
Issa: I performed a face to face bedside interview with patient regarding history of present illness, review of symptoms and past medical history. I completed an independent physical exam.  I have discussed patient's plan of care with resident.   I agree with note as stated above including HISTORY OF PRESENT ILLNESS, HIV, PAST MEDICAL/SURGICAL/FAMILY/SOCIAL HISTORY, ALLERGIES AND HOME MEDICATIONS, REVIEW OF SYSTEMS, PHYSICAL EXAM, MEDICAL DECISION MAKING and any PROGRESS NOTES during the time I functioned as the attending physician for this patient unless otherwise noted. My brief assessment is as follows: 29 yo F with no significant PMH presenting with hives, lip swelling since 2 am. On exam pt hds, DANIELLA with no wheezes, stridor; edematous upper lip and bilateral periorbital swelling, oropharynx with no edema, erythematous papular rash diffusely to abdomen arms, back face. Concern for allergic reaction. Ordered benadryl famotidine prednisone. Patient with improvement with PO meds.

## 2025-04-25 NOTE — ED PROVIDER NOTE - PHYSICAL EXAMINATION
General: NAD, swollen upper lip, periorbital edema   HEENT: Normocephalic, atraumatic, PERRL, EOM intact, uvula midline, no swelling in oropharynx  Neck: No apparent stiffness or JVD  Pulm: Chest wall symmetric and nontender, lungs clear to ascultation, no wheezes, rhales, DANIELLA, no stridor   Cardiac: Regular rate and regular rhythm, 2+ radial pulses bilaterally  Abdomen: Soft, nontender, nondistended, no HSM, no CVAT  Skin: erythematous papular rash diffusely over chest, abdomen, upper back, bilateral hands, neck. Skin is warm, dry   Neuro: No motor or sensory deficits above reported baseline, AOx3, no facial droop, no dysarthria, moves all extremities, symmetric sensation to bilateral extremities   MSK: No deformity or tenderness above reported baseline, no LE pitting edema

## 2025-04-25 NOTE — ED ADULT NURSE NOTE - OBJECTIVE STATEMENT
pt comes in with c/o hives and itching x2 hours. pt denies taking any meds for the itching / hives, resp equal and unlabored, denies any new meds or possible allergins like soaps/ detergents

## 2025-04-25 NOTE — ED ADULT TRIAGE NOTE - CHIEF COMPLAINT QUOTE
pt BIBEMS c/o hives x2 hours. pt denies taking medication PTA. pt denies feeling of throat swelling, difficulty swallowing, new shampoos, creams, or products.

## 2025-04-25 NOTE — ED PROVIDER NOTE - OBJECTIVE STATEMENT
31 yo F with no significant PMH presenting with hives, lip swelling since 2 am. Pt noticed itchy rash that started on her abdomen, spread to her back, hands, arms, ears. Her upper lip and eyelids started to swell. Pt stood up to use restroom had an episode where she became nauseous, blurry vision, lightheaded and upper abdominal pain. Pt denies new lotions, soaps, detergents, new clothes/sheets. Pt has no known allergies and has never had allergic reaction previously. Pt denies headache, cough, sore throat, swelling in throat, emesis, dysuria, hematuria, diarrhea, constipation, numbness, weakness.

## 2025-04-25 NOTE — ED PROVIDER NOTE - PATIENT PORTAL LINK FT
You can access the FollowMyHealth Patient Portal offered by [facility] by registering at the following website: [email or web address]. By joining [employer]’s FollowMyHealth portal, you will also be able to view your health information using other applications (apps) compatible with our system.

## 2025-06-17 ENCOUNTER — APPOINTMENT (OUTPATIENT)
Dept: OBGYN | Facility: CLINIC | Age: 30
End: 2025-06-17
Payer: MEDICAID

## 2025-06-17 VITALS
DIASTOLIC BLOOD PRESSURE: 72 MMHG | SYSTOLIC BLOOD PRESSURE: 110 MMHG | BODY MASS INDEX: 28.04 KG/M2 | HEIGHT: 61 IN | WEIGHT: 148.5 LBS

## 2025-06-17 PROCEDURE — 99459 PELVIC EXAMINATION: CPT

## 2025-06-17 PROCEDURE — 99395 PREV VISIT EST AGE 18-39: CPT

## 2025-06-18 LAB
C TRACH RRNA SPEC QL NAA+PROBE: NOT DETECTED
N GONORRHOEA RRNA SPEC QL NAA+PROBE: NOT DETECTED
SOURCE TP AMPLIFICATION: NORMAL

## 2025-06-24 LAB — CYTOLOGY CVX/VAG DOC THIN PREP: NORMAL
